# Patient Record
Sex: FEMALE | Race: WHITE | NOT HISPANIC OR LATINO | Employment: OTHER | URBAN - METROPOLITAN AREA
[De-identification: names, ages, dates, MRNs, and addresses within clinical notes are randomized per-mention and may not be internally consistent; named-entity substitution may affect disease eponyms.]

---

## 2020-08-21 RX ORDER — OXCARBAZEPINE 150 MG/1
TABLET, FILM COATED ORAL
Qty: 180 TABLET | Refills: 0 | Status: SHIPPED | OUTPATIENT
Start: 2020-08-21 | End: 2021-11-18 | Stop reason: SDUPTHER

## 2020-08-21 RX ORDER — LEVOTHYROXINE SODIUM 88 UG/1
TABLET ORAL
Qty: 45 TABLET | Refills: 0 | Status: SHIPPED | OUTPATIENT
Start: 2020-08-21 | End: 2020-10-13 | Stop reason: SDUPTHER

## 2020-08-21 RX ORDER — LISINOPRIL 2.5 MG/1
TABLET ORAL
Qty: 90 TABLET | Refills: 0 | Status: SHIPPED | OUTPATIENT
Start: 2020-08-21 | End: 2020-12-15

## 2020-08-21 RX ORDER — SAXAGLIPTIN 5 MG/1
TABLET, FILM COATED ORAL
Qty: 90 TABLET | Refills: 0 | Status: SHIPPED | OUTPATIENT
Start: 2020-08-21 | End: 2020-12-18

## 2020-09-21 DIAGNOSIS — G60.9 IDIOPATHIC PERIPHERAL NEUROPATHY: Primary | ICD-10-CM

## 2020-09-23 RX ORDER — GABAPENTIN 300 MG/1
CAPSULE ORAL
Qty: 180 CAPSULE | Refills: 0 | Status: SHIPPED | OUTPATIENT
Start: 2020-09-23 | End: 2020-09-25 | Stop reason: SDUPTHER

## 2020-09-23 RX ORDER — LEVOTHYROXINE SODIUM 0.1 MG/1
TABLET ORAL
Qty: 45 TABLET | Refills: 0 | Status: SHIPPED | OUTPATIENT
Start: 2020-09-23 | End: 2020-09-25 | Stop reason: SDUPTHER

## 2020-09-25 DIAGNOSIS — G60.9 IDIOPATHIC PERIPHERAL NEUROPATHY: ICD-10-CM

## 2020-09-25 DIAGNOSIS — E03.9 ACQUIRED HYPOTHYROIDISM: Primary | ICD-10-CM

## 2020-09-25 RX ORDER — LEVOTHYROXINE SODIUM 0.1 MG/1
100 TABLET ORAL EVERY OTHER DAY
Qty: 45 TABLET | Refills: 0 | Status: SHIPPED | OUTPATIENT
Start: 2020-09-25 | End: 2020-10-13

## 2020-09-25 RX ORDER — GABAPENTIN 300 MG/1
CAPSULE ORAL
Qty: 180 CAPSULE | Refills: 0 | Status: SHIPPED | OUTPATIENT
Start: 2020-09-25 | End: 2021-04-06 | Stop reason: SDUPTHER

## 2020-09-29 PROBLEM — E03.4 HYPOTHYROIDISM DUE TO ACQUIRED ATROPHY OF THYROID: Status: ACTIVE | Noted: 2020-09-29

## 2020-09-29 PROBLEM — E66.09 CLASS 1 OBESITY DUE TO EXCESS CALORIES WITH SERIOUS COMORBIDITY AND BODY MASS INDEX (BMI) OF 32.0 TO 32.9 IN ADULT: Status: ACTIVE | Noted: 2020-09-29

## 2020-09-29 PROBLEM — N18.30 CHRONIC KIDNEY DISEASE, STAGE III (MODERATE): Status: ACTIVE | Noted: 2020-09-29

## 2020-09-29 PROBLEM — E78.2 MIXED HYPERLIPIDEMIA: Status: ACTIVE | Noted: 2020-09-29

## 2020-09-29 PROBLEM — G60.9 IDIOPATHIC PERIPHERAL NEUROPATHY: Status: ACTIVE | Noted: 2020-09-29

## 2020-09-29 PROBLEM — E55.9 VITAMIN D DEFICIENCY: Status: ACTIVE | Noted: 2020-09-29

## 2020-09-29 PROBLEM — Z98.890 STATUS POST DISCECTOMY: Status: ACTIVE | Noted: 2020-09-29

## 2020-09-29 PROBLEM — Z87.19 HISTORY OF DIVERTICULOSIS: Status: ACTIVE | Noted: 2020-09-29

## 2020-09-29 PROBLEM — F32.A CHRONIC DEPRESSION: Status: ACTIVE | Noted: 2020-09-29

## 2020-09-29 PROBLEM — L98.8 FACIAL RHYTIDS: Status: ACTIVE | Noted: 2020-09-29

## 2020-09-29 PROBLEM — Z79.4 TYPE 2 DIABETES MELLITUS WITHOUT COMPLICATION, WITH LONG-TERM CURRENT USE OF INSULIN: Status: ACTIVE | Noted: 2020-09-29

## 2020-09-29 PROBLEM — Z86.010 HISTORY OF COLON POLYPS: Status: ACTIVE | Noted: 2020-09-29

## 2020-09-29 PROBLEM — Z90.49 STATUS POST CHOLECYSTECTOMY: Status: ACTIVE | Noted: 2020-09-29

## 2020-09-29 PROBLEM — E11.9 TYPE 2 DIABETES MELLITUS WITHOUT COMPLICATION, WITH LONG-TERM CURRENT USE OF INSULIN: Status: ACTIVE | Noted: 2020-09-29

## 2020-09-29 RX ORDER — FLURBIPROFEN SODIUM 0.3 MG/ML
SOLUTION/ DROPS OPHTHALMIC
COMMUNITY
Start: 2020-06-29 | End: 2021-05-20 | Stop reason: SDUPTHER

## 2020-09-29 RX ORDER — DULOXETIN HYDROCHLORIDE 60 MG/1
CAPSULE, DELAYED RELEASE ORAL
COMMUNITY
Start: 2020-09-21 | End: 2021-04-06 | Stop reason: SDUPTHER

## 2020-09-29 RX ORDER — INSULIN GLARGINE 100 [IU]/ML
INJECTION, SOLUTION SUBCUTANEOUS
COMMUNITY
Start: 2020-08-28 | End: 2021-04-06

## 2020-09-29 RX ORDER — METFORMIN HYDROCHLORIDE 1000 MG/1
1000 TABLET, FILM COATED, EXTENDED RELEASE ORAL 2 TIMES DAILY
COMMUNITY
Start: 2020-09-18 | End: 2020-09-29 | Stop reason: SDUPTHER

## 2020-09-29 RX ORDER — GLIPIZIDE 10 MG/1
TABLET, FILM COATED, EXTENDED RELEASE ORAL
COMMUNITY
Start: 2020-07-16 | End: 2020-11-10

## 2020-09-29 RX ORDER — DULOXETIN HYDROCHLORIDE 30 MG/1
CAPSULE, DELAYED RELEASE ORAL
COMMUNITY
Start: 2020-09-21 | End: 2020-10-28

## 2020-09-29 RX ORDER — METFORMIN HYDROCHLORIDE 1000 MG/1
1000 TABLET, FILM COATED, EXTENDED RELEASE ORAL 2 TIMES DAILY
Qty: 60 TABLET | Refills: 0 | Status: SHIPPED | OUTPATIENT
Start: 2020-09-29 | End: 2020-10-14 | Stop reason: SDUPTHER

## 2020-10-07 ENCOUNTER — RESULTS ENCOUNTER (OUTPATIENT)
Dept: INTERNAL MEDICINE | Facility: CLINIC | Age: 80
End: 2020-10-07

## 2020-10-07 DIAGNOSIS — E03.4 HYPOTHYROIDISM DUE TO ACQUIRED ATROPHY OF THYROID: ICD-10-CM

## 2020-10-07 DIAGNOSIS — E03.9 ACQUIRED HYPOTHYROIDISM: ICD-10-CM

## 2020-10-07 DIAGNOSIS — E55.9 VITAMIN D DEFICIENCY: ICD-10-CM

## 2020-10-07 DIAGNOSIS — Z79.4 TYPE 2 DIABETES MELLITUS WITHOUT COMPLICATION, WITH LONG-TERM CURRENT USE OF INSULIN (HCC): ICD-10-CM

## 2020-10-07 DIAGNOSIS — E78.2 MIXED HYPERLIPIDEMIA: ICD-10-CM

## 2020-10-07 DIAGNOSIS — E11.9 TYPE 2 DIABETES MELLITUS WITHOUT COMPLICATION, WITH LONG-TERM CURRENT USE OF INSULIN (HCC): ICD-10-CM

## 2020-10-08 DIAGNOSIS — E03.4 HYPOTHYROIDISM DUE TO ACQUIRED ATROPHY OF THYROID: ICD-10-CM

## 2020-10-08 DIAGNOSIS — E11.9 TYPE 2 DIABETES MELLITUS WITHOUT COMPLICATION, WITH LONG-TERM CURRENT USE OF INSULIN (HCC): ICD-10-CM

## 2020-10-08 DIAGNOSIS — Z79.4 TYPE 2 DIABETES MELLITUS WITHOUT COMPLICATION, WITH LONG-TERM CURRENT USE OF INSULIN (HCC): ICD-10-CM

## 2020-10-08 DIAGNOSIS — E78.2 MIXED HYPERLIPIDEMIA: ICD-10-CM

## 2020-10-08 DIAGNOSIS — E03.9 ACQUIRED HYPOTHYROIDISM: ICD-10-CM

## 2020-10-08 DIAGNOSIS — E55.9 VITAMIN D DEFICIENCY: ICD-10-CM

## 2020-10-10 LAB
25(OH)D3+25(OH)D2 SERPL-MCNC: 41.5 NG/ML (ref 30–100)
ALBUMIN SERPL-MCNC: 4.4 G/DL (ref 3.7–4.7)
ALBUMIN/CREAT UR: 26 MG/G CREAT (ref 0–29)
ALBUMIN/GLOB SERPL: 1.9 {RATIO} (ref 1.2–2.2)
ALP SERPL-CCNC: 84 IU/L (ref 39–117)
ALT SERPL-CCNC: 14 IU/L (ref 0–32)
AST SERPL-CCNC: 15 IU/L (ref 0–40)
BASOPHILS # BLD AUTO: 0 X10E3/UL (ref 0–0.2)
BASOPHILS NFR BLD AUTO: 1 %
BILIRUB SERPL-MCNC: 0.4 MG/DL (ref 0–1.2)
BUN SERPL-MCNC: 13 MG/DL (ref 8–27)
BUN/CREAT SERPL: 12 (ref 12–28)
CALCIUM SERPL-MCNC: 9.3 MG/DL (ref 8.7–10.3)
CHLORIDE SERPL-SCNC: 102 MMOL/L (ref 96–106)
CHOLEST SERPL-MCNC: 117 MG/DL (ref 100–199)
CO2 SERPL-SCNC: 23 MMOL/L (ref 20–29)
CREAT SERPL-MCNC: 1.05 MG/DL (ref 0.57–1)
CREAT UR-MCNC: 114.6 MG/DL
EOSINOPHIL # BLD AUTO: 0.3 X10E3/UL (ref 0–0.4)
EOSINOPHIL NFR BLD AUTO: 4 %
ERYTHROCYTE [DISTWIDTH] IN BLOOD BY AUTOMATED COUNT: 13 % (ref 11.7–15.4)
GLOBULIN SER CALC-MCNC: 2.3 G/DL (ref 1.5–4.5)
GLUCOSE SERPL-MCNC: 60 MG/DL (ref 65–99)
HCT VFR BLD AUTO: 35.9 % (ref 34–46.6)
HDLC SERPL-MCNC: 46 MG/DL
HGB BLD-MCNC: 12.3 G/DL (ref 11.1–15.9)
IMM GRANULOCYTES # BLD AUTO: 0 X10E3/UL (ref 0–0.1)
IMM GRANULOCYTES NFR BLD AUTO: 0 %
LDLC SERPL CALC-MCNC: 48 MG/DL (ref 0–99)
LDLC/HDLC SERPL: 1 RATIO (ref 0–3.2)
LYMPHOCYTES # BLD AUTO: 1.4 X10E3/UL (ref 0.7–3.1)
LYMPHOCYTES NFR BLD AUTO: 19 %
MCH RBC QN AUTO: 31.3 PG (ref 26.6–33)
MCHC RBC AUTO-ENTMCNC: 34.3 G/DL (ref 31.5–35.7)
MCV RBC AUTO: 91 FL (ref 79–97)
MICROALBUMIN UR-MCNC: 30 UG/ML
MONOCYTES # BLD AUTO: 0.4 X10E3/UL (ref 0.1–0.9)
MONOCYTES NFR BLD AUTO: 6 %
NEUTROPHILS # BLD AUTO: 5.1 X10E3/UL (ref 1.4–7)
NEUTROPHILS NFR BLD AUTO: 70 %
PLATELET # BLD AUTO: 345 X10E3/UL (ref 150–450)
POTASSIUM SERPL-SCNC: 4.4 MMOL/L (ref 3.5–5.2)
PROT SERPL-MCNC: 6.7 G/DL (ref 6–8.5)
RBC # BLD AUTO: 3.93 X10E6/UL (ref 3.77–5.28)
SODIUM SERPL-SCNC: 141 MMOL/L (ref 134–144)
T4 FREE SERPL-MCNC: 1.22 NG/DL (ref 0.82–1.77)
TRIGL SERPL-MCNC: 130 MG/DL (ref 0–149)
TSH SERPL DL<=0.005 MIU/L-ACNC: 0.11 UIU/ML (ref 0.45–4.5)
VIT B12 SERPL-MCNC: 1129 PG/ML (ref 232–1245)
VLDLC SERPL CALC-MCNC: 23 MG/DL (ref 5–40)
WBC # BLD AUTO: 7.2 X10E3/UL (ref 3.4–10.8)

## 2020-10-13 ENCOUNTER — OFFICE VISIT (OUTPATIENT)
Dept: INTERNAL MEDICINE | Facility: CLINIC | Age: 80
End: 2020-10-13

## 2020-10-13 ENCOUNTER — TELEPHONE (OUTPATIENT)
Dept: INTERNAL MEDICINE | Facility: CLINIC | Age: 80
End: 2020-10-13

## 2020-10-13 VITALS
WEIGHT: 163 LBS | RESPIRATION RATE: 16 BRPM | SYSTOLIC BLOOD PRESSURE: 108 MMHG | TEMPERATURE: 96.8 F | HEART RATE: 83 BPM | DIASTOLIC BLOOD PRESSURE: 72 MMHG | HEIGHT: 60 IN | OXYGEN SATURATION: 96 % | BODY MASS INDEX: 32 KG/M2

## 2020-10-13 DIAGNOSIS — E66.09 CLASS 1 OBESITY DUE TO EXCESS CALORIES WITH SERIOUS COMORBIDITY AND BODY MASS INDEX (BMI) OF 32.0 TO 32.9 IN ADULT: ICD-10-CM

## 2020-10-13 DIAGNOSIS — Z86.010 HISTORY OF COLON POLYPS: ICD-10-CM

## 2020-10-13 DIAGNOSIS — N18.30 CHRONIC KIDNEY DISEASE, STAGE III (MODERATE) (HCC): ICD-10-CM

## 2020-10-13 DIAGNOSIS — G60.9 IDIOPATHIC PERIPHERAL NEUROPATHY: ICD-10-CM

## 2020-10-13 DIAGNOSIS — E55.9 VITAMIN D DEFICIENCY: Primary | ICD-10-CM

## 2020-10-13 DIAGNOSIS — E03.4 HYPOTHYROIDISM DUE TO ACQUIRED ATROPHY OF THYROID: ICD-10-CM

## 2020-10-13 DIAGNOSIS — E78.2 MIXED HYPERLIPIDEMIA: ICD-10-CM

## 2020-10-13 DIAGNOSIS — Z79.4 TYPE 2 DIABETES MELLITUS WITHOUT COMPLICATION, WITH LONG-TERM CURRENT USE OF INSULIN (HCC): ICD-10-CM

## 2020-10-13 DIAGNOSIS — E11.9 TYPE 2 DIABETES MELLITUS WITHOUT COMPLICATION, WITH LONG-TERM CURRENT USE OF INSULIN (HCC): ICD-10-CM

## 2020-10-13 DIAGNOSIS — F32.A CHRONIC DEPRESSION: ICD-10-CM

## 2020-10-13 PROCEDURE — 99214 OFFICE O/P EST MOD 30 MIN: CPT | Performed by: INTERNAL MEDICINE

## 2020-10-13 RX ORDER — LEVOTHYROXINE SODIUM 88 UG/1
88 TABLET ORAL DAILY
Qty: 30 TABLET | Refills: 1 | Status: SHIPPED | OUTPATIENT
Start: 2020-10-13 | End: 2021-04-06

## 2020-10-13 NOTE — PATIENT INSTRUCTIONS
-Diet, low carbohydrate, high-protein (diets that are low in carbohydrates and high in protein are very popular for weight loss)  -Exercise 30 to 45 minutes, 4-5 times per week  -Giving encouragement to exercise (we encourage all of her patients to exercise regularly 30 minutes of exercise 5 or more days per week)  -Special dietary instruction-we recommend you modify your diet to achieve and maintain a healthy weight.  Being overweight may increase your risk for developing health problems such as diabetes, heart disease and cancer.  Avoid high fat foods and eat a balanced diet rich in fruits and vegetables.  The combination of a reduced calorie diet and increased physical activity is recommended.    Diabetes handout:    Recommended fasting blood glucose     Recommend glucose 2 hours after meals less than 180    Symptoms of low blood glucose:  Confusion Dizziness feeling shaky Hunger Headache   Irritability Sweating Trembling Weakness Anxiety  Pale skin pounding heart    If your blood glucose is low, follow the steps below:  Follow the 15-15 rule: Eat or drink something from the list below equal to 15 g of carbohydrates  Rest for 15 minutes, then recheck your blood glucose.  If it is still low, (below 70), repeat step 1 above.  If your next meal is more than 1 hour away, you will need to eat 1 carbohydrate choice as a snack to keep your blood glucose from going low again.  If you cannot figure out why you have low blood glucose, call the office or go to the emergency room, as your medication may need to be adjusted.  Always carry something with you to treat an insulin reaction.  Use food from the list below.    Foods equal to 1 carbohydrate choice (15 g of carbohydrates):  3 glucose tablets or 4 dextrose tablets  4 ounces of fruit juice  5 to 6 ounces (approximately one half can) of regular soda such as a Coke or Pepsi  7-8 gummy or regular lifesavers  1 tablespoon of sugar or jelly.    If you have type 1  diabetes and you do not take care of low blood glucose, you may pass out.  If you do, a drug called glucagon should be injected into your skin, like you do with insulin.  This can be done by a family member or a friend who has been taught how to do it.  Since glucagon may cause you to vomit, you should be placed on your side when the injection is given.  If no one knows how to give the injection, you should be taken to the hospital.    You should awaken 10 minutes after the glucagon is injected.  If you do not, you should be taken at once to the hospital.    Cardiovascular risk in diabetic patients.    The presence of diabetes is associated with increased cardiovascular risk, particularly among women.  Patients with diabetes have a 2-4 times increased risk for cardiovascular disease, with more than two thirds of patients with diabetes eventually dying of heart disease.  The risk for stroke is 1.8-1.6 fold in diabetics.  Patients with diabetes are more likely to have undiagnosed coronary artery disease and have worse outcomes when hospitalized for other cardiovascular conditions, such as heart failure.    Hemoglobin A1c    Your blood sugar levels vary from minute to minute an hour to hour.  That is why you must test your blood sugar at home at certain times of the day.  You may test before and after meals, before and after exercise and whenever there are interruptions to your usual daily routine (travel, illness, stress, etc.).  However, you cannot test your blood sugar all of the time!  So, how can you know what your overall level of control is?    The hemoglobin A1c (HbA1C) test is like a batting average.  For example, doris Pinto was able to hit home runs sometimes and strike out at others.  However, his overall batting average showed that he was a great hitter most of the time!  Your hemoglobin A1c let you know how well you have controlled your blood sugar on the average for the last 3 months.  The chart below  compares the results of a hemoglobin A1c to an average daily blood sugar range.    Hemoglobin A1c      average blood sugar range for the last 3 months.  4.0 to 6.0%        mg/deciliter-normal  6.1 to 7.0%       120-150 mg/deciliter  7.1 to 8.0%       151-180 mg/deciliter  8.1 to 9.0%       181-210 mg/deciliter  9.1 to 10%       211-240 mg/deciliter  10.1 to 11%       241-270 mg/deciliter  11.1 to 12%       271-300 mg/deciliter  12.1% to 13%       301-330 mg/deciliter  13.1% to 14%       331-360 mg/deciliter  Greater than 14%      greater than 360 mg/deciliter    How does hemoglobin A1c work?  The hemoglobin A1c measures the amount of sugar that attaches to protein in your red blood cells.  Your red blood cells live for ~2 to 3 months, so this test shows your average blood sugar levels during this time.  The greater the amount of sugar in your blood and the longer it is high, the more sugar that will attached to those red blood cells.

## 2020-10-13 NOTE — TELEPHONE ENCOUNTER
Patient called and states that she takes the levothyroxine 88 MG on Monday, Wednesday, Friday, Sunday and the 100 MG on Tuesday, Thursday, Sunday.    Patient states she also received her flu shot.    Thank You

## 2020-10-14 RX ORDER — METFORMIN HYDROCHLORIDE 1000 MG/1
1000 TABLET, FILM COATED, EXTENDED RELEASE ORAL 2 TIMES DAILY
Qty: 180 TABLET | Refills: 0 | Status: SHIPPED | OUTPATIENT
Start: 2020-10-14 | End: 2020-10-16 | Stop reason: SDUPTHER

## 2020-10-16 DIAGNOSIS — Z79.4 TYPE 2 DIABETES MELLITUS WITHOUT COMPLICATION, WITH LONG-TERM CURRENT USE OF INSULIN (HCC): Primary | ICD-10-CM

## 2020-10-16 DIAGNOSIS — E11.9 TYPE 2 DIABETES MELLITUS WITHOUT COMPLICATION, WITH LONG-TERM CURRENT USE OF INSULIN (HCC): Primary | ICD-10-CM

## 2020-10-16 RX ORDER — METFORMIN HYDROCHLORIDE 1000 MG/1
1000 TABLET, FILM COATED, EXTENDED RELEASE ORAL 2 TIMES DAILY
Qty: 180 TABLET | Refills: 1 | Status: SHIPPED | OUTPATIENT
Start: 2020-10-16 | End: 2021-06-17 | Stop reason: SDUPTHER

## 2020-10-18 ENCOUNTER — RESULTS ENCOUNTER (OUTPATIENT)
Dept: INTERNAL MEDICINE | Facility: CLINIC | Age: 80
End: 2020-10-18

## 2020-10-18 DIAGNOSIS — E03.4 HYPOTHYROIDISM DUE TO ACQUIRED ATROPHY OF THYROID: ICD-10-CM

## 2020-10-20 RX ORDER — ATORVASTATIN CALCIUM 40 MG/1
TABLET, FILM COATED ORAL
Qty: 90 TABLET | Refills: 0 | Status: SHIPPED | OUTPATIENT
Start: 2020-10-20 | End: 2021-02-01

## 2020-10-20 NOTE — TELEPHONE ENCOUNTER
Next visit: 1/14/21    Last refill: No refill in epic-office note from 10/13 states continue current tx of Atorvatstain 40 mg.

## 2020-10-28 RX ORDER — DULOXETIN HYDROCHLORIDE 30 MG/1
CAPSULE, DELAYED RELEASE ORAL
Qty: 90 CAPSULE | Refills: 1 | Status: SHIPPED | OUTPATIENT
Start: 2020-10-28 | End: 2021-04-06 | Stop reason: SDUPTHER

## 2020-11-10 RX ORDER — GLIPIZIDE 10 MG/1
TABLET, FILM COATED, EXTENDED RELEASE ORAL
Qty: 180 TABLET | Refills: 0 | Status: SHIPPED | OUTPATIENT
Start: 2020-11-10 | End: 2021-04-23 | Stop reason: SDUPTHER

## 2020-12-15 RX ORDER — LISINOPRIL 2.5 MG/1
TABLET ORAL
Qty: 90 TABLET | Refills: 0 | Status: SHIPPED | OUTPATIENT
Start: 2020-12-15 | End: 2021-06-02 | Stop reason: SDUPTHER

## 2020-12-18 RX ORDER — SAXAGLIPTIN 5 MG/1
TABLET, FILM COATED ORAL
Qty: 30 TABLET | Refills: 0 | Status: SHIPPED | OUTPATIENT
Start: 2020-12-18 | End: 2021-04-06 | Stop reason: SDUPTHER

## 2021-02-01 RX ORDER — ATORVASTATIN CALCIUM 40 MG/1
TABLET, FILM COATED ORAL
Qty: 30 TABLET | Refills: 1 | Status: SHIPPED | OUTPATIENT
Start: 2021-02-01 | End: 2021-07-12

## 2021-03-02 DIAGNOSIS — Z23 IMMUNIZATION DUE: ICD-10-CM

## 2021-04-06 ENCOUNTER — OFFICE VISIT (OUTPATIENT)
Dept: INTERNAL MEDICINE | Facility: CLINIC | Age: 81
End: 2021-04-06

## 2021-04-06 VITALS
HEIGHT: 60 IN | BODY MASS INDEX: 31.8 KG/M2 | SYSTOLIC BLOOD PRESSURE: 118 MMHG | DIASTOLIC BLOOD PRESSURE: 80 MMHG | TEMPERATURE: 97.5 F | WEIGHT: 162 LBS

## 2021-04-06 DIAGNOSIS — Z86.010 HISTORY OF COLON POLYPS: ICD-10-CM

## 2021-04-06 DIAGNOSIS — E03.4 HYPOTHYROIDISM DUE TO ACQUIRED ATROPHY OF THYROID: Chronic | ICD-10-CM

## 2021-04-06 DIAGNOSIS — Z79.4 TYPE 2 DIABETES MELLITUS WITHOUT COMPLICATION, WITH LONG-TERM CURRENT USE OF INSULIN (HCC): Primary | Chronic | ICD-10-CM

## 2021-04-06 DIAGNOSIS — F32.A CHRONIC DEPRESSION: Chronic | ICD-10-CM

## 2021-04-06 DIAGNOSIS — N18.31 STAGE 3A CHRONIC KIDNEY DISEASE (HCC): ICD-10-CM

## 2021-04-06 DIAGNOSIS — Z11.59 NEED FOR HEPATITIS C SCREENING TEST: ICD-10-CM

## 2021-04-06 DIAGNOSIS — Z78.0 POST-MENOPAUSAL: ICD-10-CM

## 2021-04-06 DIAGNOSIS — G60.9 IDIOPATHIC PERIPHERAL NEUROPATHY: Chronic | ICD-10-CM

## 2021-04-06 DIAGNOSIS — E11.9 TYPE 2 DIABETES MELLITUS WITHOUT COMPLICATION, WITH LONG-TERM CURRENT USE OF INSULIN (HCC): Primary | Chronic | ICD-10-CM

## 2021-04-06 PROBLEM — N18.30 CHRONIC KIDNEY DISEASE, STAGE III (MODERATE): Chronic | Status: ACTIVE | Noted: 2020-09-29

## 2021-04-06 PROBLEM — E78.2 MIXED HYPERLIPIDEMIA: Chronic | Status: ACTIVE | Noted: 2020-09-29

## 2021-04-06 PROCEDURE — 99214 OFFICE O/P EST MOD 30 MIN: CPT | Performed by: INTERNAL MEDICINE

## 2021-04-06 RX ORDER — DULOXETIN HYDROCHLORIDE 30 MG/1
30 CAPSULE, DELAYED RELEASE ORAL DAILY
Qty: 90 CAPSULE | Refills: 3 | Status: SHIPPED | OUTPATIENT
Start: 2021-04-06 | End: 2021-08-20 | Stop reason: SDUPTHER

## 2021-04-06 RX ORDER — LEVOTHYROXINE SODIUM 0.1 MG/1
100 TABLET ORAL EVERY OTHER DAY
COMMUNITY
End: 2021-04-06

## 2021-04-06 RX ORDER — LEVOTHYROXINE SODIUM 88 UG/1
88 TABLET ORAL DAILY
COMMUNITY
End: 2021-06-14 | Stop reason: SDUPTHER

## 2021-04-06 RX ORDER — ASPIRIN 81 MG/1
81 TABLET ORAL DAILY
COMMUNITY
End: 2022-11-29

## 2021-04-06 RX ORDER — LANOLIN ALCOHOL/MO/W.PET/CERES
1000 CREAM (GRAM) TOPICAL DAILY
COMMUNITY

## 2021-04-06 RX ORDER — LEVOTHYROXINE SODIUM 112 UG/1
112 TABLET ORAL EVERY OTHER DAY
COMMUNITY
End: 2021-11-22

## 2021-04-06 RX ORDER — GABAPENTIN 300 MG/1
CAPSULE ORAL
Qty: 180 CAPSULE | Refills: 1 | Status: SHIPPED | OUTPATIENT
Start: 2021-04-06 | End: 2021-08-01 | Stop reason: SDUPTHER

## 2021-04-06 RX ORDER — DULOXETIN HYDROCHLORIDE 60 MG/1
60 CAPSULE, DELAYED RELEASE ORAL DAILY
Qty: 90 CAPSULE | Refills: 3 | Status: SHIPPED | OUTPATIENT
Start: 2021-04-06 | End: 2021-08-20 | Stop reason: SDUPTHER

## 2021-04-06 NOTE — PROGRESS NOTES
Subjective        Chief Complaint   Patient presents with   • Establish Care           Yamsin Cedeno is a 81 y.o. female who presents for    Patient Active Problem List   Diagnosis   • Type 2 diabetes mellitus without complication, with long-term current use of insulin (CMS/[facility])   • Chronic kidney disease, stage III (moderate)   • Vitamin D deficiency   • Mixed hyperlipidemia   • Hypothyroidism due to acquired atrophy of thyroid   • Chronic depression   • Idiopathic peripheral neuropathy   • History of colon polyps   • Facial rhytids   • Class 1 obesity due to excess calories with serious comorbidity and body mass index (BMI) of 32.0 to 32.9 in adult       History of Present Illness     She had been seeing Dr. Schwartz for over a decade. Her sugars run . She does not check her BP. She denies chest pain, dyspnea, nausea or abdominal pain. She has been on Cymbalta since her   8 years ago. She has had two back surgeries in the past. She thinks she takes Trileptal for jumping in her feet. Dr. Schwartz's last note says she was taking it and Neurontin for peripheral neuropathy.  Allergies   Allergen Reactions   • Cefaclor Hives   • Epinephrine Palpitations     Elevated heart rate at the dentist       Current Outpatient Medications on File Prior to Visit   Medication Sig Dispense Refill   • aspirin (aspirin) 81 MG EC tablet Take 81 mg by mouth Daily.     • atorvastatin (LIPITOR) 40 MG tablet TAKE ONE TABLET BY MOUTH DAILY 30 tablet 1   • B-D UF III MINI PEN NEEDLES 31G X 5 MM misc      • glipizide (GLUCOTROL XL) 10 MG 24 hr tablet TAKE TWO TABLETS BY MOUTH DAILY 180 tablet 0   • Insulin Glargine (LANTUS SOLOSTAR) 100 UNIT/ML injection pen Inject 23 Units under the skin into the appropriate area as directed Daily.     • levothyroxine (SYNTHROID, LEVOTHROID) 112 MCG tablet Take 112 mcg by mouth Every Other Day.     • levothyroxine (SYNTHROID, LEVOTHROID) 88 MCG tablet Take 88 mcg by mouth Daily. Take one tab  qod     • lisinopril (PRINIVIL,ZESTRIL) 2.5 MG tablet TAKE ONE TABLET BY MOUTH DAILY 90 tablet 0   • metFORMIN (GLUMETZA) 1000 MG (MOD) 24 hr tablet Take 1 tablet by mouth 2 (Two) Times a Day. 180 tablet 1   • OXcarbazepine (TRILEPTAL) 150 MG tablet TAKE ONE TABLET BY MOUTH TWICE A  tablet 0   • vitamin B-12 (CYANOCOBALAMIN) 1000 MCG tablet Take 1,000 mcg by mouth Daily.     • [DISCONTINUED] DULoxetine (CYMBALTA) 30 MG capsule TAKE ONE CAPSULE BY MOUTH DAILY 90 capsule 1   • [DISCONTINUED] DULoxetine (CYMBALTA) 60 MG capsule      • [DISCONTINUED] gabapentin (NEURONTIN) 300 MG capsule 2 TABS PO QHS AS NEEDED 180 capsule 0   • [DISCONTINUED] Lantus SoloStar 100 UNIT/ML injection pen      • [DISCONTINUED] levothyroxine (SYNTHROID, LEVOTHROID) 100 MCG tablet Take 100 mcg by mouth Every Other Day.     • [DISCONTINUED] Onglyza 5 MG tablet TAKE ONE TABLET BY MOUTH DAILY 30 tablet 0   • [DISCONTINUED] levothyroxine (Synthroid) 88 MCG tablet Take 1 tablet by mouth Daily. 30 tablet 1     No current facility-administered medications on file prior to visit.       Past Medical History:   Diagnosis Date   • AC joint arthropathy    • B12 deficiency    • Chronic back pain    • History of colon polyps    • History of eczema    • History of fall    • History of urinary tract infection    • Insomnia    • Loose body of shoulder    • Low back pain with radiation    • Nonspecific reaction to tuberculin skin test without active tuberculosis     0 active evie delgado 12/2002 Ct Chest 09/2002   • Obesity, Class I, BMI 30-34.9    • Osteoarthritis of shoulder    • Peripheral neuropathy     Back surgery   • Psoriasis    • Rhytides    • Rotator cuff tear    • Spondylolysis    • Tinnitus    • Vitamin D deficiency    • Yeast infection        Past Surgical History:   Procedure Laterality Date   • BACK SURGERY      discectomy L5-S1. Hodes 01/06 & 03/06 repeated. Repeat Disc surgery 03/2003.   • CATARACT EXTRACTION      Left, [place]  "S/p Cataract Rt    • CHOLECYSTECTOMY  1999    [place]   • COLONOSCOPY     • TONSILLECTOMY  89       Family History   Problem Relation Age of Onset   • Hypertension Mother    • Other Mother         acute myocardial infarction    • Other Father         acute myocardial infarction    • Diabetes Maternal Grandfather    • Breast cancer Other    • Liver cancer Other        Social History     Socioeconomic History   • Marital status:      Spouse name: Not on file   • Number of children: Not on file   • Years of education: Not on file   • Highest education level: Not on file   Tobacco Use   • Smoking status: Former Smoker     Quit date:      Years since quittin.2   • Smokeless tobacco: Never Used   Substance and Sexual Activity   • Alcohol use: Never   • Drug use: Never           The following portions of the patient's history were reviewed and updated as appropriate: problem list, allergies, current medications, past medical history, past family history, past social history and past surgical history.    Review of Systems    Immunization History   Administered Date(s) Administered   • COVID-19 (PFIZER) 2021, 2021   • Hepatitis A 2018, 2019   • Pneumococcal Conjugate 13-Valent (PCV13) 2018   • Pneumococcal Polysaccharide (PPSV23) 2019       Objective   Vitals:    21 0950   BP: 118/80   Temp: 97.5 °F (36.4 °C)   Weight: 73.5 kg (162 lb)   Height: 152.4 cm (60\")     Body mass index is 31.64 kg/m².  Physical Exam  Vitals reviewed.   Constitutional:       Appearance: She is well-developed.   HENT:      Head: Normocephalic and atraumatic.   Neck:      Thyroid: No thyromegaly.      Vascular: No carotid bruit.      Trachea: Trachea normal.   Cardiovascular:      Rate and Rhythm: Normal rate and regular rhythm.      Heart sounds: Normal heart sounds, S1 normal and S2 normal.   Pulmonary:      Effort: Pulmonary effort is normal.      Breath sounds: Normal breath " sounds.   Lymphadenopathy:      Cervical: No cervical adenopathy.   Skin:     General: Skin is warm.   Neurological:      Mental Status: She is alert.   Psychiatric:         Behavior: Behavior normal.         Procedures    Assessment/Plan   Diagnoses and all orders for this visit:    1. Type 2 diabetes mellitus without complication, with long-term current use of insulin (CMS/HCC) (Primary)  -     Comprehensive Metabolic Panel  -     Hemoglobin A1c  -     SAXagliptin (Onglyza) 5 MG tablet; Take 1 tablet by mouth Daily.  Dispense: 90 tablet; Refill: 3  -     Hemoglobin A1c; Future  -     Lipid Panel With / Chol / HDL Ratio; Future  -     Microalbumin / Creatinine Urine Ratio - Urine, Clean Catch; Future    2. Hypothyroidism due to acquired atrophy of thyroid  -     TSH  -     TSH; Future    3. History of colon polyps  -     Ambulatory Referral For Screening Colonoscopy    4. Stage 3a chronic kidney disease (CMS/HCC)  -     Comprehensive Metabolic Panel; Future    5. Chronic depression  -     DULoxetine (CYMBALTA) 60 MG capsule; Take 1 capsule by mouth Daily.  Dispense: 90 capsule; Refill: 3  -     DULoxetine (CYMBALTA) 30 MG capsule; Take 1 capsule by mouth Daily.  Dispense: 90 capsule; Refill: 3    6. Idiopathic peripheral neuropathy  -     gabapentin (NEURONTIN) 300 MG capsule; 2 TABS PO QHS AS NEEDED  Dispense: 180 capsule; Refill: 1    7. Post-menopausal  -     DEXA Bone Density Axial    8. Need for hepatitis C screening test  -     Hepatitis C Antibody               Recc Tdap and Shingrix. Baldomero TINSLEY. Set up cscope since sounds like been 5 years. Labs today. BP is good. I reviewed Dr. Schwartz's last note from here and her last one from [facility]. She had not been taking her onglyza for a while waiting to be seen here.  Return in about 4 months (around 8/6/2021) for Lab Today, Lab Before [facility], Medicare Wellness.

## 2021-04-07 LAB
ALBUMIN SERPL-MCNC: 4.6 G/DL (ref 3.5–5.2)
ALBUMIN/GLOB SERPL: 2.1 G/DL
ALP SERPL-CCNC: 87 U/L (ref 39–117)
ALT SERPL-CCNC: 13 U/L (ref 1–33)
AST SERPL-CCNC: 14 U/L (ref 1–32)
BILIRUB SERPL-MCNC: 0.5 MG/DL (ref 0–1.2)
BUN SERPL-MCNC: 12 MG/DL (ref 8–23)
BUN/CREAT SERPL: 10 (ref 7–25)
CALCIUM SERPL-MCNC: 10.2 MG/DL (ref 8.6–10.5)
CHLORIDE SERPL-SCNC: 102 MMOL/L (ref 98–107)
CO2 SERPL-SCNC: 28.6 MMOL/L (ref 22–29)
CREAT SERPL-MCNC: 1.2 MG/DL (ref 0.57–1)
GLOBULIN SER CALC-MCNC: 2.2 GM/DL
GLUCOSE SERPL-MCNC: 91 MG/DL (ref 65–99)
HBA1C MFR BLD: 7.4 % (ref 4.8–5.6)
HCV AB S/CO SERPL IA: <0.1 S/CO RATIO (ref 0–0.9)
POTASSIUM SERPL-SCNC: 4.9 MMOL/L (ref 3.5–5.2)
PROT SERPL-MCNC: 6.8 G/DL (ref 6–8.5)
SODIUM SERPL-SCNC: 143 MMOL/L (ref 136–145)
TSH SERPL DL<=0.005 MIU/L-ACNC: 0.76 UIU/ML (ref 0.27–4.2)

## 2021-04-09 NOTE — PROGRESS NOTES
PT had not been taking lantus for a week and a half due to no one would fill it until she had an appt. Should she increase or stay the same since she hasn't had any

## 2021-04-23 RX ORDER — GLIPIZIDE 10 MG/1
20 TABLET, FILM COATED, EXTENDED RELEASE ORAL DAILY
Qty: 180 TABLET | Refills: 0 | Status: SHIPPED | OUTPATIENT
Start: 2021-04-23 | End: 2021-06-08

## 2021-04-23 NOTE — TELEPHONE ENCOUNTER
Last ov 4/6/21  Ok to refill ?    Glipizide 10 mg    Take 2 tablets osmar     Qty 180    Jose TRIANA

## 2021-05-20 RX ORDER — FLURBIPROFEN SODIUM 0.3 MG/ML
SOLUTION/ DROPS OPHTHALMIC
Qty: 50 EACH | Refills: 3 | Status: SHIPPED | OUTPATIENT
Start: 2021-05-20 | End: 2022-08-23

## 2021-05-20 NOTE — TELEPHONE ENCOUNTER
Caller: Yasmin Cedeno    Relationship: Self    Best call back number: [phone]     Medication needed:   Requested Prescriptions     Pending Prescriptions Disp Refills   • B-D UF III MINI PEN NEEDLES 31G X 5 MM misc         When do you need the refill by: ASAP    Does the patient have less than a 3 day supply:  [x] Yes  [] No    What is the patient's preferred pharmacy: [facility] AT [facility] [facility] [phone] [facility] [phone] FX

## 2021-06-02 RX ORDER — LISINOPRIL 2.5 MG/1
2.5 TABLET ORAL DAILY
Qty: 90 TABLET | Refills: 0 | Status: SHIPPED | OUTPATIENT
Start: 2021-06-02 | End: 2021-10-14

## 2021-06-02 RX ORDER — INSULIN GLARGINE 100 [IU]/ML
INJECTION, SOLUTION SUBCUTANEOUS
Qty: 15 ML | Refills: 0 | Status: SHIPPED | OUTPATIENT
Start: 2021-06-02 | End: 2021-08-01 | Stop reason: SDUPTHER

## 2021-06-04 ENCOUNTER — HOSPITAL ENCOUNTER (EMERGENCY)
Facility: HOSPITAL | Age: 81
Discharge: HOME | End: 2021-06-04
Attending: EMERGENCY MEDICINE | Admitting: EMERGENCY MEDICINE

## 2021-06-04 ENCOUNTER — APPOINTMENT (OUTPATIENT)
Dept: GENERAL RADIOLOGY | Facility: HOSPITAL | Age: 81
End: 2021-06-04

## 2021-06-04 VITALS
SYSTOLIC BLOOD PRESSURE: 125 MMHG | WEIGHT: 163.58 LBS | DIASTOLIC BLOOD PRESSURE: 82 MMHG | HEART RATE: 96 BPM | OXYGEN SATURATION: 95 % | TEMPERATURE: 97.4 F | BODY MASS INDEX: 32.12 KG/M2 | HEIGHT: 60 IN | RESPIRATION RATE: 16 BRPM

## 2021-06-04 DIAGNOSIS — E11.649 HYPOGLYCEMIC EVENT IN DIABETES (HCC): Primary | ICD-10-CM

## 2021-06-04 DIAGNOSIS — E83.42 HYPOMAGNESEMIA: ICD-10-CM

## 2021-06-04 DIAGNOSIS — N39.0 URINARY TRACT INFECTION IN FEMALE: ICD-10-CM

## 2021-06-04 LAB
ALBUMIN SERPL-MCNC: 4 G/DL (ref 3.5–5.2)
ALBUMIN/GLOB SERPL: 1.4 G/DL
ALP SERPL-CCNC: 90 U/L (ref 39–117)
ALT SERPL W P-5'-P-CCNC: 11 U/L (ref 1–33)
ANION GAP SERPL CALCULATED.3IONS-SCNC: 12 MMOL/L (ref 5–15)
AST SERPL-CCNC: 15 U/L (ref 1–32)
BACTERIA UR QL AUTO: ABNORMAL /HPF
BASOPHILS # BLD AUTO: 0.05 10*3/MM3 (ref 0–0.2)
BASOPHILS NFR BLD AUTO: 0.7 % (ref 0–1.5)
BILIRUB SERPL-MCNC: 0.4 MG/DL (ref 0–1.2)
BILIRUB UR QL STRIP: NEGATIVE
BUN SERPL-MCNC: 9 MG/DL (ref 8–23)
BUN/CREAT SERPL: 9.1 (ref 7–25)
CALCIUM SPEC-SCNC: 9.1 MG/DL (ref 8.6–10.5)
CHLORIDE SERPL-SCNC: 106 MMOL/L (ref 98–107)
CLARITY UR: CLEAR
CO2 SERPL-SCNC: 25 MMOL/L (ref 22–29)
COLOR UR: YELLOW
CREAT SERPL-MCNC: 0.99 MG/DL (ref 0.57–1)
DEPRECATED RDW RBC AUTO: 47.7 FL (ref 37–54)
EOSINOPHIL # BLD AUTO: 0.1 10*3/MM3 (ref 0–0.4)
EOSINOPHIL NFR BLD AUTO: 1.3 % (ref 0.3–6.2)
ERYTHROCYTE [DISTWIDTH] IN BLOOD BY AUTOMATED COUNT: 13.6 % (ref 12.3–15.4)
GFR SERPL CREATININE-BSD FRML MDRD: 54 ML/MIN/1.73
GLOBULIN UR ELPH-MCNC: 2.8 GM/DL
GLUCOSE BLDC GLUCOMTR-MCNC: 138 MG/DL (ref 70–130)
GLUCOSE BLDC GLUCOMTR-MCNC: 244 MG/DL (ref 70–130)
GLUCOSE SERPL-MCNC: 125 MG/DL (ref 65–99)
GLUCOSE UR STRIP-MCNC: NEGATIVE MG/DL
HCT VFR BLD AUTO: 39.6 % (ref 34–46.6)
HGB BLD-MCNC: 13.4 G/DL (ref 12–15.9)
HGB UR QL STRIP.AUTO: ABNORMAL
HYALINE CASTS UR QL AUTO: ABNORMAL /LPF
IMM GRANULOCYTES # BLD AUTO: 0.04 10*3/MM3 (ref 0–0.05)
IMM GRANULOCYTES NFR BLD AUTO: 0.5 % (ref 0–0.5)
KETONES UR QL STRIP: NEGATIVE
LEUKOCYTE ESTERASE UR QL STRIP.AUTO: NEGATIVE
LYMPHOCYTES # BLD AUTO: 0.83 10*3/MM3 (ref 0.7–3.1)
LYMPHOCYTES NFR BLD AUTO: 10.8 % (ref 19.6–45.3)
MAGNESIUM SERPL-MCNC: 1.4 MG/DL (ref 1.6–2.4)
MCH RBC QN AUTO: 32.1 PG (ref 26.6–33)
MCHC RBC AUTO-ENTMCNC: 33.8 G/DL (ref 31.5–35.7)
MCV RBC AUTO: 95 FL (ref 79–97)
MONOCYTES # BLD AUTO: 0.36 10*3/MM3 (ref 0.1–0.9)
MONOCYTES NFR BLD AUTO: 4.7 % (ref 5–12)
NEUTROPHILS NFR BLD AUTO: 6.31 10*3/MM3 (ref 1.7–7)
NEUTROPHILS NFR BLD AUTO: 82 % (ref 42.7–76)
NITRITE UR QL STRIP: NEGATIVE
NRBC BLD AUTO-RTO: 0 /100 WBC (ref 0–0.2)
PH UR STRIP.AUTO: 6 [PH] (ref 5–8)
PLATELET # BLD AUTO: 285 10*3/MM3 (ref 140–450)
PMV BLD AUTO: 9.2 FL (ref 6–12)
POTASSIUM SERPL-SCNC: 4 MMOL/L (ref 3.5–5.2)
PROT SERPL-MCNC: 6.8 G/DL (ref 6–8.5)
PROT UR QL STRIP: NEGATIVE
RBC # BLD AUTO: 4.17 10*6/MM3 (ref 3.77–5.28)
RBC # UR: ABNORMAL /HPF
REF LAB TEST METHOD: ABNORMAL
SODIUM SERPL-SCNC: 143 MMOL/L (ref 136–145)
SP GR UR STRIP: 1.02 (ref 1–1.03)
SQUAMOUS #/AREA URNS HPF: ABNORMAL /HPF
TROPONIN T SERPL-MCNC: <0.01 NG/ML (ref 0–0.03)
UROBILINOGEN UR QL STRIP: ABNORMAL
WBC # BLD AUTO: 7.69 10*3/MM3 (ref 3.4–10.8)
WBC UR QL AUTO: ABNORMAL /HPF

## 2021-06-04 PROCEDURE — 81001 URINALYSIS AUTO W/SCOPE: CPT | Performed by: EMERGENCY MEDICINE

## 2021-06-04 PROCEDURE — 83735 ASSAY OF MAGNESIUM: CPT | Performed by: EMERGENCY MEDICINE

## 2021-06-04 PROCEDURE — 25010000002 MAGNESIUM SULFATE IN D5W 1G/100ML (PREMIX) 1-5 GM/100ML-% SOLUTION: Performed by: EMERGENCY MEDICINE

## 2021-06-04 PROCEDURE — 84484 ASSAY OF TROPONIN QUANT: CPT | Performed by: EMERGENCY MEDICINE

## 2021-06-04 PROCEDURE — 71045 X-RAY EXAM CHEST 1 VIEW: CPT

## 2021-06-04 PROCEDURE — 82962 GLUCOSE BLOOD TEST: CPT

## 2021-06-04 PROCEDURE — 96365 THER/PROPH/DIAG IV INF INIT: CPT

## 2021-06-04 PROCEDURE — 80053 COMPREHEN METABOLIC PANEL: CPT | Performed by: EMERGENCY MEDICINE

## 2021-06-04 PROCEDURE — 85025 COMPLETE CBC W/AUTO DIFF WBC: CPT | Performed by: EMERGENCY MEDICINE

## 2021-06-04 PROCEDURE — 99283 EMERGENCY DEPT VISIT LOW MDM: CPT

## 2021-06-04 RX ORDER — MAGNESIUM SULFATE 1 G/100ML
1 INJECTION INTRAVENOUS ONCE
Status: COMPLETED | OUTPATIENT
Start: 2021-06-04 | End: 2021-06-04

## 2021-06-04 RX ORDER — SULFAMETHOXAZOLE AND TRIMETHOPRIM 800; 160 MG/1; MG/1
1 TABLET ORAL 2 TIMES DAILY
Qty: 6 TABLET | Refills: 0 | Status: SHIPPED | OUTPATIENT
Start: 2021-06-04 | End: 2021-08-05

## 2021-06-04 RX ADMIN — MAGNESIUM SULFATE HEPTAHYDRATE 1 G: 1 INJECTION, SOLUTION INTRAVENOUS at 14:27

## 2021-06-04 NOTE — ED NOTES
Pt infiltration site on left AC appears more edematous at this time. I informed Dr. Smith and he assessed the pt and requested an ace wrap be applied to the arm. I placed an ace wrap on the pts left arm from left upper arm to left mid forearm. Pt denies pain at this time.      Jesica Calderon, RN  06/04/21 [id number]

## 2021-06-04 NOTE — ED PROVIDER NOTES
EMERGENCY DEPARTMENT ENCOUNTER    Room Number:  29/29  Date of encounter:  6/4/2021  PCP: Dash Sanchez MD  Historian: Patient, daughter      HPI:  Chief Complaint: Hypoglycemia  A complete HPI/ROS/PMH/PSH/SH/FH are unobtainable due to: None    Context: Yasmin Cedeno is a 81 y.o. female who presents to the ED via [facility] from home after the daughter found her altered today, EMS reported glucose of 46, gave her 1 tube of oral glucose and an amp of D50 and recheck was 78.  Currently patient reports feeling much better than she did though somewhat tired.  States that she did not eat breakfast this morning.  States that she takes 26 units of nighttime insulin every night before bed but does not usually eat a meal and she states that she is waking up in the middle of the night having falls and disorientation.  Denies any recent fevers, chills, cough which has become short of breath, dysuria.  Patient has intermittent chronic diarrhea, did have an episode of vomiting in recent days but is not currently nauseous.  Denies any pain at this time.      MEDICAL RECORD REVIEW    Primary care provider visit April 6, 2021, showing type 2 diabetes on saxagliptin, with triage note reviewed showing 26 units of nighttime insulin as well as being on Metformin    PAST MEDICAL HISTORY  Active Ambulatory Problems     Diagnosis Date Noted   • Type 2 diabetes mellitus without complication, with long-term current use of insulin (CMS/[facility]) 09/29/2020   • Chronic kidney disease, stage III (moderate) 09/29/2020   • Vitamin D deficiency 09/29/2020   • Mixed hyperlipidemia 09/29/2020   • Hypothyroidism due to acquired atrophy of thyroid 09/29/2020   • Chronic depression 09/29/2020   • Idiopathic peripheral neuropathy 09/29/2020   • History of colon polyps 09/29/2020   • Facial rhytids 09/29/2020   • Class 1 obesity due to excess calories with serious comorbidity and body mass index (BMI) of 32.0 to 32.9 in adult 09/29/2020      Resolved Ambulatory Problems     Diagnosis Date Noted   • No Resolved Ambulatory Problems     Past Medical History:   Diagnosis Date   • AC joint arthropathy    • B12 deficiency    • Chronic back pain    • History of eczema    • History of fall    • History of urinary tract infection    • Insomnia    • Loose body of shoulder    • Low back pain with radiation    • Nonspecific reaction to tuberculin skin test without active tuberculosis    • Obesity, Class I, BMI 30-34.9    • Osteoarthritis of shoulder    • Peripheral neuropathy    • Psoriasis    • Rhytides    • Rotator cuff tear    • Spondylolysis    • Tinnitus    • Yeast infection          PAST SURGICAL HISTORY  Past Surgical History:   Procedure Laterality Date   • BACK SURGERY      discectomy L5-S1. Hodes  &  repeated. Repeat Disc surgery 2003.   • CATARACT EXTRACTION      Left, Zoran S/p Cataract Rt    • CHOLECYSTECTOMY  1999    Dominguez   • COLONOSCOPY     • TONSILLECTOMY  194         FAMILY HISTORY  Family History   Problem Relation Age of Onset   • Hypertension Mother    • Other Mother         acute myocardial infarction    • Other Father         acute myocardial infarction    • Diabetes Maternal Grandfather    • Breast cancer Other    • Liver cancer Other          SOCIAL HISTORY  Social History     Socioeconomic History   • Marital status:      Spouse name: Not on file   • Number of children: Not on file   • Years of education: Not on file   • Highest education level: Not on file   Tobacco Use   • Smoking status: Former Smoker     Quit date:      Years since quittin.4   • Smokeless tobacco: Never Used   Substance and Sexual Activity   • Alcohol use: Never   • Drug use: Never         ALLERGIES  Cefaclor and Epinephrine        REVIEW OF SYSTEMS  Review of Systems     All systems reviewed and negative except for those discussed in HPI.       PHYSICAL EXAM    I have reviewed the triage vital signs and nursing  notes.    ED Triage Vitals [06/04/21 1305]   Temp Heart Rate Resp BP SpO2   97.4 °F (36.3 °C) 100 16 174/83 96 %      Temp src Heart Rate Source Patient Position BP Location FiO2 (%)   -- -- -- -- --       Physical Exam  General: Awake, alert, no acute distress  HEENT: Mucous membranes tacky, atraumatic, EOMI  Neck: Full ROM  Pulm: Symmetric chest rise, nonlabored, lungs CTAB  Cardiovascular: Regular rate and rhythm, intact distal pulses, no evidence of IV infiltration the left upper extremity from EMS IV line with localized edema in the left AC fossa without erythema, heat, or purulence  GI: Soft, nontender, nondistended, no rebound, no guarding, bowel sounds present  MSK: Full ROM, no deformity, faint superficial abrasions and ecchymosis to the knees bilaterally though intact and full range of motion without significant discomfort  Skin: Warm, dry  Neuro: Alert and oriented x 3, GCS 15, cranial nerves II through XII intact, 5-5 strength sensation bilateral upper and lower extremity, no dysarthria or aphasia, moving all extremities, no focal deficits  Psych: Calm, cooperative      Surgical mask, protective eye goggles, and gloves used during this encounter. Patient in surgical mask.      LAB RESULTS  Recent Results (from the past 24 hour(s))   Comprehensive Metabolic Panel    Collection Time: 06/04/21  1:34 PM    Specimen: Blood   Result Value Ref Range    Glucose 125 (H) 65 - 99 mg/dL    BUN 9 8 - 23 mg/dL    Creatinine 0.99 0.57 - 1.00 mg/dL    Sodium 143 136 - 145 mmol/L    Potassium 4.0 3.5 - 5.2 mmol/L    Chloride 106 98 - 107 mmol/L    CO2 25.0 22.0 - 29.0 mmol/L    Calcium 9.1 8.6 - 10.5 mg/dL    Total Protein 6.8 6.0 - 8.5 g/dL    Albumin 4.00 3.50 - 5.20 g/dL    ALT (SGPT) 11 1 - 33 U/L    AST (SGOT) 15 1 - 32 U/L    Alkaline Phosphatase 90 39 - 117 U/L    Total Bilirubin 0.4 0.0 - 1.2 mg/dL    eGFR Non African Amer 54 (L) >60 mL/min/1.73    Globulin 2.8 gm/dL    A/G Ratio 1.4 g/dL    BUN/Creatinine Ratio  9.1 7.0 - 25.0    Anion Gap 12.0 5.0 - 15.0 mmol/L   Troponin    Collection Time: 06/04/21  1:34 PM    Specimen: Blood   Result Value Ref Range    Troponin T <0.010 0.000 - 0.030 ng/mL   Magnesium    Collection Time: 06/04/21  1:34 PM    Specimen: Blood   Result Value Ref Range    Magnesium 1.4 (L) 1.6 - 2.4 mg/dL   CBC Auto Differential    Collection Time: 06/04/21  1:34 PM    Specimen: Blood   Result Value Ref Range    WBC 7.69 3.40 - 10.80 10*3/mm3    RBC 4.17 3.77 - 5.28 10*6/mm3    Hemoglobin 13.4 12.0 - 15.9 g/dL    Hematocrit 39.6 34.0 - 46.6 %    MCV 95.0 79.0 - 97.0 fL    MCH 32.1 26.6 - 33.0 pg    MCHC 33.8 31.5 - 35.7 g/dL    RDW 13.6 12.3 - 15.4 %    RDW-SD 47.7 37.0 - 54.0 fl    MPV 9.2 6.0 - 12.0 fL    Platelets 285 140 - 450 10*3/mm3    Neutrophil % 82.0 (H) 42.7 - 76.0 %    Lymphocyte % 10.8 (L) 19.6 - 45.3 %    Monocyte % 4.7 (L) 5.0 - 12.0 %    Eosinophil % 1.3 0.3 - 6.2 %    Basophil % 0.7 0.0 - 1.5 %    Immature Grans % 0.5 0.0 - 0.5 %    Neutrophils, Absolute 6.31 1.70 - 7.00 10*3/mm3    Lymphocytes, Absolute 0.83 0.70 - 3.10 10*3/mm3    Monocytes, Absolute 0.36 0.10 - 0.90 10*3/mm3    Eosinophils, Absolute 0.10 0.00 - 0.40 10*3/mm3    Basophils, Absolute 0.05 0.00 - 0.20 10*3/mm3    Immature Grans, Absolute 0.04 0.00 - 0.05 10*3/mm3    nRBC 0.0 0.0 - 0.2 /100 WBC   Urinalysis With Microscopic If Indicated (No Culture) - Urine, Catheter    Collection Time: 06/04/21  1:44 PM    Specimen: Urine, Catheter   Result Value Ref Range    Color, UA Yellow Yellow, Straw    Appearance, UA Clear Clear    pH, UA 6.0 5.0 - 8.0    Specific Gravity, UA 1.017 1.005 - 1.030    Glucose, UA Negative Negative    Ketones, UA Negative Negative    Bilirubin, UA Negative Negative    Blood, UA Small (1+) (A) Negative    Protein, UA Negative Negative    Leuk Esterase, UA Negative Negative    Nitrite, UA Negative Negative    Urobilinogen, UA 1.0 E.U./dL 0.2 - 1.0 E.U./dL   Urinalysis, Microscopic Only - Urine, Catheter     Collection Time: 06/04/21  1:44 PM    Specimen: Urine, Catheter   Result Value Ref Range    RBC, UA 0-2 None Seen, 0-2 /HPF    WBC, UA 3-5 (A) None Seen, 0-2 /HPF    Bacteria, UA 4+ (A) None Seen /HPF    Squamous Epithelial Cells, UA 0-2 None Seen, 0-2 /HPF    Hyaline Casts, UA 3-6 None Seen /LPF    Methodology Automated Microscopy    POC Glucose Once    Collection Time: 06/04/21  1:49 PM    Specimen: Blood   Result Value Ref Range    Glucose 138 (H) 70 - 130 mg/dL   POC Glucose Once    Collection Time: 06/04/21  2:50 PM    Specimen: Blood   Result Value Ref Range    Glucose 244 (H) 70 - 130 mg/dL       Ordered the above labs and independently reviewed the results.        RADIOLOGY  XR Chest 1 View    Result Date: 6/4/2021  XR CHEST 1 VW-  HISTORY: Female who is 81 years-old,  vomiting, cough  TECHNIQUE: Frontal view of the chest  COMPARISON: None available  FINDINGS: Heart size is normal. Aorta appears tortuous. Pulmonary vasculature is unremarkable. A 1 cm nodular density projecting between the left anterior fifth and sixth ribs at the left base corresponds to a calcified nodular density at the left base on the CT from 09/29/2002. No focal pulmonary consolidation, pleural effusion, or pneumothorax. No acute osseous process.      No evidence for acute pulmonary process. Tortuous appearing aorta. Follow-up as clinically indicated.  This report was finalized on 6/4/2021 3:15 PM by Dr. Bakari Remy M.D.        I ordered the above noted radiological studies. Reviewed by me.  See dictation for official radiology interpretation.      PROCEDURES    Procedures      MEDICATIONS GIVEN IN ER    Medications   magnesium sulfate in D5W 1g/100mL (PREMIX) (0 g Intravenous Stopped 6/4/21 1534)         PROGRESS, DATA ANALYSIS, CONSULTS, AND MEDICAL DECISION MAKING    All labs have been independently reviewed by me.  All radiology studies have been reviewed by me and discussed with radiologist dictating the report.   EKG's  independently viewed and interpreted by me.  Discussion below represents my analysis of pertinent findings related to patient's condition, differential diagnosis, treatment plan and final disposition.    Initial concern for hypoglycemia, renal failure, UTI, pneumonia, electrolyte abnormalities, among others.    ED Course as of Jun 04 2041 Fri Jun 04, 2021   1419 WBC: 7.69 [DC]   1419 Hemoglobin: 13.4 [DC]   1419 Nitrite, UA: Negative [DC]   1419 Leukocytes, UA: Negative [DC]   1419 Magnesium(!): 1.4 [DC]   1419 Troponin T: <0.010 [DC]   1419 Bacteria, UA(!): 4+ [DC]   1419 WBC, UA(!): 3-5 [DC]   1419 Glucose(!): 125 [DC]   1419 Creatinine: 0.99 [DC]   1421 Patient has reported some increased urinary urgency recently with intermittent dysuria, based on urinary findings which are kind of borderline without nitrites or leukocyte Estrace but does have 4+ bacteria and 3-5 white blood cells, I will treat with a short course of antibiotics.    [DC]   1520 XR Chest 1 View [DC]      ED Course User Index  [DC] Mil Smith MD       AS OF 20:41 EDT VITALS:    BP - 125/82  HR - 96  TEMP - 97.4 °F (36.3 °C)  02 SATS - 95%        DIAGNOSIS  Final diagnoses:   Hypoglycemic event in diabetes (CMS/[facility])   Hypomagnesemia   Urinary tract infection in female         DISPOSITION  DISCHARGE    Patient discharged in stable condition.    Reviewed implications of results, diagnosis, meds, responsibility to follow up, warning signs and symptoms of possible worsening, potential complications and reasons to return to ER.    Patient/Family voiced understanding of above instructions.    Discussed plan for discharge, as there is no emergent indication for admission. Patient referred to primary care provider for BP management due to today's BP. Pt/family is agreeable and understands need for follow up and repeat testing.  Pt is aware that discharge does not mean that nothing is wrong but it indicates no emergency is present that requires  admission and they must continue care with follow-up as given below or physician of their choice.     FOLLOW-UP  [facility] Emergency Department  [address] [address] [phone]    As needed, If symptoms worsen    Dash Sanchez MD  [address] [id number] [phone]    Call on 6/7/2021  to set up close follow up appointment         Medication List      New Prescriptions    sulfamethoxazole-trimethoprim 800-160 MG per tablet  Commonly known as: BACTRIM DS,SEPTRA DS  Take 1 tablet by mouth 2 (Two) Times a Day. Drink plenty of water           Where to Get Your Medications      You can get these medications from any pharmacy    Bring a paper prescription for each of these medications  · sulfamethoxazole-trimethoprim 800-160 MG per tablet                    Mil Smith MD  06/04/21 2048

## 2021-06-04 NOTE — ED NOTES
Pt to ED room 29 via EMS. A&Ox4. Pt reports that she was at home and woke up with a bunch of people in her house. I did not receive report from EMS but pt says that they did give her something for low blood sugar on her way to the hospital. Pt had an IV in the left AC that appears red and infiltrated so I removed this during pt assessment. Pt reports pain in the back of her head that she describes as throbbing and rates at a 5/10. Pt reports that she takes insulin at bedtime that she thinks is 26 units and that she has been falling recently and her dr thinks it is because her blood sugar gets low. Pt says she is unsure if she ate dinner last night or not. Pt denies chest pain, soa, nausea, vomiting and diarrhea. No other complaints at this time.      Jesica Calderon, RN  06/04/21 0875

## 2021-06-04 NOTE — DISCHARGE INSTRUCTIONS
Be sure that you are eating a meal when taking her insulin, eat regular meals, close follow-up with your primary care provider next week for recheck, ED return for worsening symptoms as needed.  Complete course of antibiotics as prescribed.

## 2021-06-04 NOTE — ED TRIAGE NOTES
Pt from home family found patient to be altered EMS reports glucose was 46, pt takes metformin, not insulin, ems gave patient 1 tube of oral glucose and amp d50 recheck was 78.    Patient masked in first look triage. I was wearing mask and goggles.

## 2021-06-08 ENCOUNTER — OFFICE VISIT (OUTPATIENT)
Dept: INTERNAL MEDICINE | Facility: CLINIC | Age: 81
End: 2021-06-08

## 2021-06-08 VITALS
SYSTOLIC BLOOD PRESSURE: 102 MMHG | WEIGHT: 163 LBS | BODY MASS INDEX: 32 KG/M2 | HEIGHT: 60 IN | DIASTOLIC BLOOD PRESSURE: 80 MMHG | TEMPERATURE: 97.8 F

## 2021-06-08 DIAGNOSIS — Z79.4 TYPE 2 DIABETES MELLITUS WITHOUT COMPLICATION, WITH LONG-TERM CURRENT USE OF INSULIN (HCC): Primary | Chronic | ICD-10-CM

## 2021-06-08 DIAGNOSIS — E11.9 TYPE 2 DIABETES MELLITUS WITHOUT COMPLICATION, WITH LONG-TERM CURRENT USE OF INSULIN (HCC): Primary | Chronic | ICD-10-CM

## 2021-06-08 PROCEDURE — 99213 OFFICE O/P EST LOW 20 MIN: CPT | Performed by: INTERNAL MEDICINE

## 2021-06-08 RX ORDER — GLIPIZIDE 10 MG/1
10 TABLET, FILM COATED, EXTENDED RELEASE ORAL DAILY
Qty: 30 TABLET | Refills: 3
Start: 2021-06-08 | End: 2021-08-05

## 2021-06-14 RX ORDER — LEVOTHYROXINE SODIUM 88 UG/1
88 TABLET ORAL DAILY
Qty: 90 TABLET | Refills: 0 | Status: SHIPPED | OUTPATIENT
Start: 2021-06-14 | End: 2021-11-18 | Stop reason: SDUPTHER

## 2021-06-15 NOTE — PROGRESS NOTES
"Chief Complaint   Patient presents with   • Colon Cancer Screening     history of polyps         History of Present Illness  Patient for evaluation for possible colonoscopy due to personal history of colon polyps. Her last colonoscopy was performed at [facility] with Dr. Hackett April 2016 with findings of one colon polyp and diverticulosis. She denies any family history of colon cancer. She denies any change in bowel habits or rectal bleeding. She denies any abdominal pain.     She reports a recent fall out of her bed during the night while she was sleeping. She reports that she did not wake up and EMS was called. Her blood sugar was 40 and she was taken to the hospital for evaluation.        Result Review :       Comprehensive Metabolic Panel (06/04/2021 13:34)  CBC & Differential (06/04/2021 13:34)  Hepatitis C Antibody (04/06/2021 10:38)      Vital Signs:   /70   Pulse 110   Temp 97.3 °F (36.3 °C)   Resp 16   Ht 152.4 cm (60\")   Wt 73.6 kg (162 lb 4.8 oz)   SpO2 95%   BMI 31.70 kg/m²     Body mass index is 31.7 kg/m².     Physical Exam  Vitals reviewed.   Constitutional:       Appearance: Normal appearance.   Abdominal:      General: Bowel sounds are normal. There is no distension.      Palpations: Abdomen is soft. Abdomen is not rigid. There is no mass or pulsatile mass.      Tenderness: There is no abdominal tenderness. There is no guarding or rebound.       Assessment and Plan    Diagnoses and all orders for this visit:    1. History of colon polyps (Primary)    2. Colon cancer screening       Documentation by Tamra CUEVAS acting as a scribe in the following sections (HPI, Assessment, Plan) for the undersigned provider.     BRIEF SUMMARY  Patient for evaluation for colonoscopy. She apparently had a colonoscopy in 2016 had a polyp removed. We will send for records from [facility] to review. Given her advanced age and medical fragility, I do not think I would necessarily push for a " surveillance colonoscopy at this point. We will make further recommendations pending review of prior endoscopy and pathology.    I have reviewed and confirmed the accuracy of the HPI and Assessment and Plan as documented by the APRN MASON Sow        Follow Up   No follow-ups on file.    Patient Instructions   1. For colon cancer screening, we will request your previous colonoscopy and pathology report for our review to determine if you are due for a colonoscopy. We will contact you once we have reviewed this and communicate our recommendations based upon report findings.

## 2021-06-16 ENCOUNTER — OFFICE VISIT (OUTPATIENT)
Dept: GASTROENTEROLOGY | Facility: CLINIC | Age: 81
End: 2021-06-16

## 2021-06-16 VITALS
DIASTOLIC BLOOD PRESSURE: 70 MMHG | WEIGHT: 162.3 LBS | BODY MASS INDEX: 31.86 KG/M2 | OXYGEN SATURATION: 95 % | SYSTOLIC BLOOD PRESSURE: 108 MMHG | TEMPERATURE: 97.3 F | HEART RATE: 110 BPM | HEIGHT: 60 IN | RESPIRATION RATE: 16 BRPM

## 2021-06-16 DIAGNOSIS — Z12.11 COLON CANCER SCREENING: ICD-10-CM

## 2021-06-16 DIAGNOSIS — Z86.010 HISTORY OF COLON POLYPS: Primary | ICD-10-CM

## 2021-06-16 PROCEDURE — S0260 H&P FOR SURGERY: HCPCS | Performed by: INTERNAL MEDICINE

## 2021-06-16 NOTE — PATIENT INSTRUCTIONS
1. For colon cancer screening, we will request your previous colonoscopy and pathology report for our review to determine if you are due for a colonoscopy. We will contact you once we have reviewed this and communicate our recommendations based upon report findings.

## 2021-06-17 ENCOUNTER — TELEPHONE (OUTPATIENT)
Dept: INTERNAL MEDICINE | Facility: CLINIC | Age: 81
End: 2021-06-17

## 2021-06-17 DIAGNOSIS — E11.9 TYPE 2 DIABETES MELLITUS WITHOUT COMPLICATION, WITH LONG-TERM CURRENT USE OF INSULIN (HCC): ICD-10-CM

## 2021-06-17 DIAGNOSIS — Z79.4 TYPE 2 DIABETES MELLITUS WITHOUT COMPLICATION, WITH LONG-TERM CURRENT USE OF INSULIN (HCC): ICD-10-CM

## 2021-06-17 RX ORDER — METFORMIN HYDROCHLORIDE 1000 MG/1
1000 TABLET, FILM COATED, EXTENDED RELEASE ORAL 2 TIMES DAILY
Qty: 180 TABLET | Refills: 1 | Status: SHIPPED | OUTPATIENT
Start: 2021-06-17 | End: 2021-08-01 | Stop reason: SDUPTHER

## 2021-06-17 RX ORDER — LEVOTHYROXINE SODIUM 0.1 MG/1
100 TABLET ORAL EVERY OTHER DAY
COMMUNITY
End: 2021-08-05

## 2021-06-17 NOTE — TELEPHONE ENCOUNTER
Hub staff attempted to follow warm transfer process and was unsuccessful     Caller: [facility] - [place], [place] - [address] AT [address]  - [phone] [facility] [phone] FX    Relationship to patient: Pharmacy    Best call back number: [phone]    Patient is needing: PHARMACY CALLED TO GET CLARIFICATION ON THE levothyroxine (SYNTHROID, LEVOTHROID) 88 MCG tablet, REQUESTING CLINICAL REACH BACK OUT TO THEM AT THEIR CONVENIENCE.

## 2021-06-17 NOTE — TELEPHONE ENCOUNTER
I want her to take how she was taking when had labs earlier this year.    Please update on med list

## 2021-07-09 RX ORDER — ATORVASTATIN CALCIUM 40 MG/1
TABLET, FILM COATED ORAL
Qty: 30 TABLET | Refills: 0 | OUTPATIENT
Start: 2021-07-09

## 2021-07-12 RX ORDER — ATORVASTATIN CALCIUM 40 MG/1
TABLET, FILM COATED ORAL
Qty: 30 TABLET | Refills: 2 | Status: SHIPPED | OUTPATIENT
Start: 2021-07-12 | End: 2021-12-02 | Stop reason: SDUPTHER

## 2021-07-29 DIAGNOSIS — E11.9 TYPE 2 DIABETES MELLITUS WITHOUT COMPLICATION, WITH LONG-TERM CURRENT USE OF INSULIN (HCC): Chronic | ICD-10-CM

## 2021-07-29 DIAGNOSIS — Z79.4 TYPE 2 DIABETES MELLITUS WITHOUT COMPLICATION, WITH LONG-TERM CURRENT USE OF INSULIN (HCC): Chronic | ICD-10-CM

## 2021-07-29 DIAGNOSIS — N18.31 STAGE 3A CHRONIC KIDNEY DISEASE (HCC): ICD-10-CM

## 2021-07-29 DIAGNOSIS — E03.4 HYPOTHYROIDISM DUE TO ACQUIRED ATROPHY OF THYROID: Chronic | ICD-10-CM

## 2021-07-30 LAB
ALBUMIN SERPL-MCNC: 4.6 G/DL (ref 3.5–5.2)
ALBUMIN/CREAT UR: 19 MG/G CREAT (ref 0–29)
ALBUMIN/GLOB SERPL: 2.1 G/DL
ALP SERPL-CCNC: 88 U/L (ref 39–117)
ALT SERPL-CCNC: 9 U/L (ref 1–33)
AST SERPL-CCNC: 14 U/L (ref 1–32)
BILIRUB SERPL-MCNC: 0.5 MG/DL (ref 0–1.2)
BUN SERPL-MCNC: 13 MG/DL (ref 8–23)
BUN/CREAT SERPL: 9.8 (ref 7–25)
CALCIUM SERPL-MCNC: 9.7 MG/DL (ref 8.6–10.5)
CHLORIDE SERPL-SCNC: 103 MMOL/L (ref 98–107)
CHOLEST SERPL-MCNC: 138 MG/DL (ref 0–200)
CHOLEST/HDLC SERPL: 3 {RATIO}
CO2 SERPL-SCNC: 22.5 MMOL/L (ref 22–29)
CREAT SERPL-MCNC: 1.33 MG/DL (ref 0.57–1)
CREAT UR-MCNC: 266 MG/DL
GLOBULIN SER CALC-MCNC: 2.2 GM/DL
GLUCOSE SERPL-MCNC: 67 MG/DL (ref 65–99)
HBA1C MFR BLD: 6.6 % (ref 4.8–5.6)
HDLC SERPL-MCNC: 46 MG/DL (ref 40–60)
LDLC SERPL CALC-MCNC: 64 MG/DL (ref 0–100)
MICROALBUMIN UR-MCNC: 51.2 UG/ML
POTASSIUM SERPL-SCNC: 4.4 MMOL/L (ref 3.5–5.2)
PROT SERPL-MCNC: 6.8 G/DL (ref 6–8.5)
SODIUM SERPL-SCNC: 140 MMOL/L (ref 136–145)
TRIGL SERPL-MCNC: 164 MG/DL (ref 0–150)
TSH SERPL DL<=0.005 MIU/L-ACNC: 0.38 UIU/ML (ref 0.27–4.2)
VLDLC SERPL CALC-MCNC: 28 MG/DL (ref 5–40)

## 2021-08-01 DIAGNOSIS — E11.9 TYPE 2 DIABETES MELLITUS WITHOUT COMPLICATION, WITH LONG-TERM CURRENT USE OF INSULIN (HCC): ICD-10-CM

## 2021-08-01 DIAGNOSIS — G60.9 IDIOPATHIC PERIPHERAL NEUROPATHY: Chronic | ICD-10-CM

## 2021-08-01 DIAGNOSIS — Z79.4 TYPE 2 DIABETES MELLITUS WITHOUT COMPLICATION, WITH LONG-TERM CURRENT USE OF INSULIN (HCC): ICD-10-CM

## 2021-08-02 RX ORDER — METFORMIN HYDROCHLORIDE 1000 MG/1
1000 TABLET, FILM COATED, EXTENDED RELEASE ORAL 2 TIMES DAILY
Qty: 180 TABLET | Refills: 1 | Status: SHIPPED | OUTPATIENT
Start: 2021-08-02 | End: 2022-08-15

## 2021-08-02 RX ORDER — INSULIN GLARGINE 100 [IU]/ML
26 INJECTION, SOLUTION SUBCUTANEOUS DAILY
Qty: 15 ML | Refills: 2 | Status: SHIPPED | OUTPATIENT
Start: 2021-08-02 | End: 2021-12-06

## 2021-08-02 RX ORDER — GABAPENTIN 300 MG/1
CAPSULE ORAL
Qty: 180 CAPSULE | Refills: 1 | Status: SHIPPED | OUTPATIENT
Start: 2021-08-02 | End: 2022-08-01

## 2021-08-05 ENCOUNTER — OFFICE VISIT (OUTPATIENT)
Dept: INTERNAL MEDICINE | Facility: CLINIC | Age: 81
End: 2021-08-05

## 2021-08-05 VITALS
HEIGHT: 60 IN | BODY MASS INDEX: 31.92 KG/M2 | SYSTOLIC BLOOD PRESSURE: 124 MMHG | TEMPERATURE: 97.8 F | DIASTOLIC BLOOD PRESSURE: 82 MMHG | WEIGHT: 162.6 LBS

## 2021-08-05 DIAGNOSIS — E78.2 MIXED HYPERLIPIDEMIA: Chronic | ICD-10-CM

## 2021-08-05 DIAGNOSIS — E03.4 HYPOTHYROIDISM DUE TO ACQUIRED ATROPHY OF THYROID: Chronic | ICD-10-CM

## 2021-08-05 DIAGNOSIS — F32.A CHRONIC DEPRESSION: Chronic | ICD-10-CM

## 2021-08-05 DIAGNOSIS — E11.9 TYPE 2 DIABETES MELLITUS WITHOUT COMPLICATION, WITH LONG-TERM CURRENT USE OF INSULIN (HCC): Chronic | ICD-10-CM

## 2021-08-05 DIAGNOSIS — Z00.00 MEDICARE ANNUAL WELLNESS VISIT, SUBSEQUENT: Primary | ICD-10-CM

## 2021-08-05 DIAGNOSIS — Z79.4 TYPE 2 DIABETES MELLITUS WITHOUT COMPLICATION, WITH LONG-TERM CURRENT USE OF INSULIN (HCC): Chronic | ICD-10-CM

## 2021-08-05 DIAGNOSIS — N18.30 STAGE 3 CHRONIC KIDNEY DISEASE, UNSPECIFIED WHETHER STAGE 3A OR 3B CKD (HCC): Chronic | ICD-10-CM

## 2021-08-05 PROCEDURE — 1170F FXNL STATUS ASSESSED: CPT | Performed by: INTERNAL MEDICINE

## 2021-08-05 PROCEDURE — 96160 PT-FOCUSED HLTH RISK ASSMT: CPT | Performed by: INTERNAL MEDICINE

## 2021-08-05 PROCEDURE — 1159F MED LIST DOCD IN RCRD: CPT | Performed by: INTERNAL MEDICINE

## 2021-08-05 PROCEDURE — G0439 PPPS, SUBSEQ VISIT: HCPCS | Performed by: INTERNAL MEDICINE

## 2021-08-05 NOTE — PROGRESS NOTES
The ABCs of the Annual Wellness Visit  Subsequent Medicare Wellness Visit    Chief Complaint   Patient presents with   • Medicare Wellness-subsequent       Subjective   History of Present Illness:  Yasmin Cedeno is a 81 y.o. female who presents for a Subsequent Medicare Wellness Visit.  Her sugar this am was 116. She has not been checking her BP. She denies chest pain or dyspnea. She is doing well emotionally. She met with Dr. Gunderson and they decided not to proceed with a cscope based on her age. She had a blood sugar to 68 about a week ago. She was in the ER earlier this year for falling after having a low blood sugar. Food has tasted stale since getting her COVID shots.  HEALTH RISK ASSESSMENT    Recent Hospitalizations:  No hospitalization(s) within the last year.    Current Medical Providers:  Patient Care Team:  Dash Sanchez MD as PCP - General (Internal Medicine)    Smoking Status:  Social History     Tobacco Use   Smoking Status Former Smoker   • Quit date:    • Years since quittin.6   Smokeless Tobacco Never Used       Alcohol Consumption:  Social History     Substance and Sexual Activity   Alcohol Use Never       Depression Screen:   PHQ-2/PHQ-9 Depression Screening 2021   Little interest or pleasure in doing things 0   Feeling down, depressed, or hopeless 0   Total Score 0       Fall Risk Screen:  STEADI Fall Risk Assessment was completed, and patient is at HIGH risk for falls. Assessment completed on:2021    Health Habits and Functional and Cognitive Screening:  Functional & Cognitive Status 2021   Do you have difficulty preparing food and eating? No   Do you have difficulty bathing yourself, getting dressed or grooming yourself? No   Do you have difficulty using the toilet? No   Do you have difficulty moving around from place to place? No   Do you have trouble with steps or getting out of a bed or a chair? No   Current Diet Well Balanced Diet   Dental Exam Up to date   Eye  Exam Up to date   Exercise (times per week) 0 times per week   Current Exercises Include No Regular Exercise   Do you need help using the phone?  No   Are you deaf or do you have serious difficulty hearing?  No   Do you need help with transportation? No   Do you need help shopping? No   Do you need help preparing meals?  No   Do you need help with housework?  No   Do you need help with laundry? No   Do you need help taking your medications? No   Do you need help managing money? No   Do you ever drive or ride in a car without wearing a seat belt? No   Have you felt unusual stress, anger or loneliness in the last month? No   Who do you live with? Child   If you need help, do you have trouble finding someone available to you? No   Have you been bothered in the last four weeks by sexual problems? No   Do you have difficulty concentrating, remembering or making decisions? No         Does the patient have evidence of cognitive impairment? No    Asprin use counseling:Taking ASA appropriately as indicated    Age-appropriate Screening Schedule:  Refer to the list below for future screening recommendations based on patient's age, sex and/or medical conditions. Orders for these recommended tests are listed in the plan section. The patient has been provided with a written plan.    Health Maintenance   Topic Date Due   • DXA SCAN  Never done   • TDAP/TD VACCINES (1 - Tdap) Never done   • INFLUENZA VACCINE  10/01/2021   • DIABETIC EYE EXAM  11/16/2021   • HEMOGLOBIN A1C  01/29/2022   • LIPID PANEL  07/29/2022   • URINE MICROALBUMIN  07/29/2022   • ZOSTER VACCINE  Completed          The following portions of the patient's history were reviewed and updated as appropriate: allergies, current medications, past family history, past medical history, past social history, past surgical history and problem list.    Outpatient Medications Prior to Visit   Medication Sig Dispense Refill   • aspirin (aspirin) 81 MG EC tablet Take 81 mg by  mouth Daily.     • atorvastatin (LIPITOR) 40 MG tablet TAKE ONE TABLET BY MOUTH DAILY 30 tablet 2   • B-D UF III MINI PEN NEEDLES 31G X 5 MM misc Use daily 50 each 3   • DULoxetine (CYMBALTA) 30 MG capsule Take 1 capsule by mouth Daily. 90 capsule 3   • DULoxetine (CYMBALTA) 60 MG capsule Take 1 capsule by mouth Daily. 90 capsule 3   • gabapentin (NEURONTIN) 300 MG capsule 2 TABS PO QHS AS NEEDED 180 capsule 1   • Insulin Glargine (Lantus SoloStar) 100 UNIT/ML injection pen Inject 26 Units under the skin into the appropriate area as directed Daily. 15 mL 2   • levothyroxine (SYNTHROID, LEVOTHROID) 112 MCG tablet Take 112 mcg by mouth Every Other Day.     • levothyroxine (SYNTHROID, LEVOTHROID) 88 MCG tablet Take 1 tablet by mouth Daily. Take one tab qod 90 tablet 0   • lisinopril (PRINIVIL,ZESTRIL) 2.5 MG tablet Take 1 tablet by mouth Daily. 90 tablet 0   • metFORMIN (GLUMETZA) 1000 MG (MOD) 24 hr tablet Take 1 tablet by mouth 2 (Two) Times a Day. 180 tablet 1   • OXcarbazepine (TRILEPTAL) 150 MG tablet TAKE ONE TABLET BY MOUTH TWICE A  tablet 0   • SAXagliptin (Onglyza) 5 MG tablet Take 1 tablet by mouth Daily. 90 tablet 3   • vitamin B-12 (CYANOCOBALAMIN) 1000 MCG tablet Take 1,000 mcg by mouth Daily.     • glipizide (GLUCOTROL XL) 10 MG 24 hr tablet Take 1 tablet by mouth Daily. 30 tablet 3   • levothyroxine (SYNTHROID, LEVOTHROID) 100 MCG tablet Take 100 mcg by mouth Every Other Day.     • sulfamethoxazole-trimethoprim (BACTRIM DS,SEPTRA DS) 800-160 MG per tablet Take 1 tablet by mouth 2 (Two) Times a Day. Drink plenty of water 6 tablet 0     No facility-administered medications prior to visit.       Patient Active Problem List   Diagnosis   • Type 2 diabetes mellitus without complication, with long-term current use of insulin (CMS/HCC)   • Chronic kidney disease, stage III (moderate)   • Vitamin D deficiency   • Mixed hyperlipidemia   • Hypothyroidism due to acquired atrophy of thyroid   • Chronic  "depression   • Idiopathic peripheral neuropathy   • History of colon polyps   • Facial rhytids   • Class 1 obesity due to excess calories with serious comorbidity and body mass index (BMI) of 32.0 to 32.9 in adult       Advanced Care Planning:  ACP discussion was held with the patient during this visit. Patient does not have an advance directive, information provided.    Review of Systems    Compared to one year ago, the patient feels her physical health is better.  Compared to one year ago, the patient feels her mental health is the same.    Reviewed chart for potential of high risk medication in the elderly: yes  Reviewed chart for potential of harmful drug interactions in the elderly:yes    Objective         Vitals:    08/05/21 0831   BP: 124/82   Temp: 97.8 °F (36.6 °C)   Weight: 73.8 kg (162 lb 9.6 oz)   Height: 152.4 cm (60\")       Body mass index is 31.76 kg/m².  Discussed the patient's BMI with her. The BMI is above average; BMI management plan is completed.    Physical Exam  Vitals reviewed.   Constitutional:       Appearance: She is well-developed.   HENT:      Head: Normocephalic and atraumatic.   Neck:      Vascular: No carotid bruit.   Cardiovascular:      Rate and Rhythm: Normal rate and regular rhythm.      Heart sounds: Normal heart sounds, S1 normal and S2 normal.   Pulmonary:      Effort: Pulmonary effort is normal.      Breath sounds: Normal breath sounds.   Skin:     General: Skin is warm.   Neurological:      Mental Status: She is alert.   Psychiatric:         Behavior: Behavior normal.         Lab Results   Component Value Date    GLU 67 07/29/2021    CHLPL 138 07/29/2021    TRIG 164 (H) 07/29/2021    HDL 46 07/29/2021    LDL 64 07/29/2021    VLDL 28 07/29/2021    HGBA1C 6.60 (H) 07/29/2021        Assessment/Plan   Medicare Risks and Personalized Health Plan  CMS Preventative Services Quick Reference  Advance Directive Discussion  Immunizations Discussed/Encouraged (specific immunizations; Tdap " )    The above risks/problems have been discussed with the patient.  Pertinent information has been shared with the patient in the After Visit Summary.  Follow up plans and orders are seen below in the Assessment/Plan Section.    Diagnoses and all orders for this visit:    1. Medicare annual wellness visit, subsequent (Primary)    2. Mixed hyperlipidemia  Comments:  LDL is at goal    3. Type 2 diabetes mellitus without complication, with long-term current use of insulin (CMS/[facility])  Comments:  Stop glucotrol given occasional low sugar. A1c is excellent. She has an eye exam in Nov. She will call if sugars jump a lot  Orders:  -     Hemoglobin A1c; Future    4. Hypothyroidism due to acquired atrophy of thyroid  Comments:  TSH is at goal.  Orders:  -     TSH; Future    5. Chronic depression  Comments:  Stable    6. Stage 3 chronic kidney disease, unspecified whether stage 3a or 3b CKD (CMS/[facility])  Comments:  Aware not to use NSAIDS  Orders:  -     Comprehensive Metabolic Panel; Future      Follow Up:  Return in about 4 months (around 12/5/2021) for Lab Before FUP.     An After Visit Summary and PPPS were given to the patient.         Labs good. Recc Tdap at [facility].

## 2021-08-06 ENCOUNTER — HOSPITAL ENCOUNTER (OUTPATIENT)
Dept: BONE DENSITY | Facility: HOSPITAL | Age: 81
Discharge: HOME | End: 2021-08-06
Admitting: INTERNAL MEDICINE

## 2021-08-06 PROCEDURE — 77080 DXA BONE DENSITY AXIAL: CPT

## 2021-08-20 DIAGNOSIS — Z79.4 TYPE 2 DIABETES MELLITUS WITHOUT COMPLICATION, WITH LONG-TERM CURRENT USE OF INSULIN (HCC): Chronic | ICD-10-CM

## 2021-08-20 DIAGNOSIS — F32.A CHRONIC DEPRESSION: Chronic | ICD-10-CM

## 2021-08-20 DIAGNOSIS — E11.9 TYPE 2 DIABETES MELLITUS WITHOUT COMPLICATION, WITH LONG-TERM CURRENT USE OF INSULIN (HCC): Chronic | ICD-10-CM

## 2021-08-20 RX ORDER — DULOXETIN HYDROCHLORIDE 30 MG/1
30 CAPSULE, DELAYED RELEASE ORAL DAILY
Qty: 90 CAPSULE | Refills: 3 | Status: SHIPPED | OUTPATIENT
Start: 2021-08-20 | End: 2022-12-12

## 2021-08-20 RX ORDER — DULOXETIN HYDROCHLORIDE 60 MG/1
60 CAPSULE, DELAYED RELEASE ORAL DAILY
Qty: 90 CAPSULE | Refills: 3 | Status: SHIPPED | OUTPATIENT
Start: 2021-08-20 | End: 2022-11-09 | Stop reason: SDUPTHER

## 2021-10-14 RX ORDER — LISINOPRIL 2.5 MG/1
TABLET ORAL
Qty: 90 TABLET | Refills: 0 | Status: SHIPPED | OUTPATIENT
Start: 2021-10-14 | End: 2022-03-22

## 2021-11-18 ENCOUNTER — TELEPHONE (OUTPATIENT)
Dept: INTERNAL MEDICINE | Facility: CLINIC | Age: 81
End: 2021-11-18

## 2021-11-18 RX ORDER — OXCARBAZEPINE 150 MG/1
150 TABLET, FILM COATED ORAL 2 TIMES DAILY
Qty: 180 TABLET | Refills: 1 | Status: SHIPPED | OUTPATIENT
Start: 2021-11-18 | End: 2021-12-06 | Stop reason: SDUPTHER

## 2021-11-18 RX ORDER — LEVOTHYROXINE SODIUM 88 UG/1
88 TABLET ORAL DAILY
Qty: 90 TABLET | Refills: 0 | Status: SHIPPED | OUTPATIENT
Start: 2021-11-18 | End: 2021-12-02 | Stop reason: SDUPTHER

## 2021-11-18 NOTE — TELEPHONE ENCOUNTER
Find out what dose of levothyroxine she needs b/c fax said 100 mcgm and that is not what is in chart

## 2021-11-18 NOTE — TELEPHONE ENCOUNTER
Caller: Yasmin Cedeno MISSY    Relationship: Self    Best call back number: [phone] (H)      Requested Prescriptions:   Requested Prescriptions     Pending Prescriptions Disp Refills   • levothyroxine (SYNTHROID, LEVOTHROID) 88 MCG tablet 90 tablet 0     Sig: Take 1 tablet by mouth Daily. Take one tab qod   • OXcarbazepine (TRILEPTAL) 150 MG tablet 180 tablet 0     Sig: Take 1 tablet by mouth 2 (Two) Times a Day.        [place] [place] [facility] [place]  [place] [address]  - [phone]  - [phone] FX      Additional details provided by patient: ZERO REFILLS LEFT- NEEDS A NEW PRESCRIPTION WROTE FOR THESE.    PLEASE CALL AND ADVISE     Does the patient have less than a 3 day supply:  [x] Yes  [] No    Yudy Heller, PCT   11/18/21 14:25 EST       Ca

## 2021-11-22 RX ORDER — LEVOTHYROXINE SODIUM 0.1 MG/1
100 TABLET ORAL DAILY
COMMUNITY
End: 2021-12-02 | Stop reason: SDUPTHER

## 2021-11-24 DIAGNOSIS — E11.9 TYPE 2 DIABETES MELLITUS WITHOUT COMPLICATION, WITH LONG-TERM CURRENT USE OF INSULIN (HCC): Chronic | ICD-10-CM

## 2021-11-24 DIAGNOSIS — Z79.4 TYPE 2 DIABETES MELLITUS WITHOUT COMPLICATION, WITH LONG-TERM CURRENT USE OF INSULIN (HCC): Chronic | ICD-10-CM

## 2021-11-24 DIAGNOSIS — N18.30 STAGE 3 CHRONIC KIDNEY DISEASE, UNSPECIFIED WHETHER STAGE 3A OR 3B CKD (HCC): Chronic | ICD-10-CM

## 2021-11-24 DIAGNOSIS — E03.4 HYPOTHYROIDISM DUE TO ACQUIRED ATROPHY OF THYROID: Chronic | ICD-10-CM

## 2021-12-01 LAB
ALBUMIN SERPL-MCNC: 4.2 G/DL (ref 3.7–4.7)
ALBUMIN/GLOB SERPL: 1.8 {RATIO} (ref 1.2–2.2)
ALP SERPL-CCNC: 94 IU/L (ref 44–121)
ALT SERPL-CCNC: 13 IU/L (ref 0–32)
AST SERPL-CCNC: 14 IU/L (ref 0–40)
BILIRUB SERPL-MCNC: 0.5 MG/DL (ref 0–1.2)
BUN SERPL-MCNC: 16 MG/DL (ref 8–27)
BUN/CREAT SERPL: 12 (ref 12–28)
CALCIUM SERPL-MCNC: 9.1 MG/DL (ref 8.7–10.3)
CHLORIDE SERPL-SCNC: 101 MMOL/L (ref 96–106)
CO2 SERPL-SCNC: 21 MMOL/L (ref 20–29)
CREAT SERPL-MCNC: 1.33 MG/DL (ref 0.57–1)
GLOBULIN SER CALC-MCNC: 2.3 G/DL (ref 1.5–4.5)
GLUCOSE SERPL-MCNC: 106 MG/DL (ref 65–99)
HBA1C MFR BLD: 8.5 % (ref 4.8–5.6)
POTASSIUM SERPL-SCNC: 4.4 MMOL/L (ref 3.5–5.2)
PROT SERPL-MCNC: 6.5 G/DL (ref 6–8.5)
SODIUM SERPL-SCNC: 139 MMOL/L (ref 134–144)
TSH SERPL DL<=0.005 MIU/L-ACNC: 33.7 UIU/ML (ref 0.45–4.5)

## 2021-12-02 RX ORDER — ATORVASTATIN CALCIUM 40 MG/1
40 TABLET, FILM COATED ORAL DAILY
Qty: 90 TABLET | Refills: 0 | Status: SHIPPED | OUTPATIENT
Start: 2021-12-02 | End: 2022-05-04

## 2021-12-02 RX ORDER — LEVOTHYROXINE SODIUM 0.1 MG/1
100 TABLET ORAL DAILY
Qty: 90 TABLET | Refills: 0 | Status: SHIPPED | OUTPATIENT
Start: 2021-12-02 | End: 2022-08-15

## 2021-12-02 RX ORDER — LEVOTHYROXINE SODIUM 88 UG/1
88 TABLET ORAL DAILY
Qty: 90 TABLET | Refills: 0 | Status: SHIPPED | OUTPATIENT
Start: 2021-12-02 | End: 2023-03-15

## 2021-12-06 ENCOUNTER — OFFICE VISIT (OUTPATIENT)
Dept: INTERNAL MEDICINE | Facility: CLINIC | Age: 81
End: 2021-12-06

## 2021-12-06 VITALS
SYSTOLIC BLOOD PRESSURE: 104 MMHG | WEIGHT: 157 LBS | DIASTOLIC BLOOD PRESSURE: 80 MMHG | TEMPERATURE: 97.4 F | BODY MASS INDEX: 30.82 KG/M2 | HEIGHT: 60 IN

## 2021-12-06 DIAGNOSIS — E11.9 TYPE 2 DIABETES MELLITUS WITHOUT COMPLICATION, WITH LONG-TERM CURRENT USE OF INSULIN (HCC): Primary | Chronic | ICD-10-CM

## 2021-12-06 DIAGNOSIS — E03.4 HYPOTHYROIDISM DUE TO ACQUIRED ATROPHY OF THYROID: Chronic | ICD-10-CM

## 2021-12-06 DIAGNOSIS — Z79.4 TYPE 2 DIABETES MELLITUS WITHOUT COMPLICATION, WITH LONG-TERM CURRENT USE OF INSULIN (HCC): Primary | Chronic | ICD-10-CM

## 2021-12-06 DIAGNOSIS — N18.32 STAGE 3B CHRONIC KIDNEY DISEASE (HCC): Chronic | ICD-10-CM

## 2021-12-06 PROCEDURE — 99214 OFFICE O/P EST MOD 30 MIN: CPT | Performed by: INTERNAL MEDICINE

## 2021-12-06 RX ORDER — OXCARBAZEPINE 150 MG/1
150 TABLET, FILM COATED ORAL 2 TIMES DAILY
Qty: 180 TABLET | Refills: 1 | Status: SHIPPED | OUTPATIENT
Start: 2021-12-06

## 2021-12-06 NOTE — PROGRESS NOTES
Subjective        Chief Complaint   Patient presents with   • Diabetes           Yasmin Cedeno is a 81 y.o. female who presents for    Patient Active Problem List   Diagnosis   • Type 2 diabetes mellitus without complication, with long-term current use of insulin (HCC)   • Chronic kidney disease, stage III (moderate)   • Vitamin D deficiency   • Mixed hyperlipidemia   • Hypothyroidism due to acquired atrophy of thyroid   • Chronic depression   • Idiopathic peripheral neuropathy   • Facial rhytids   • Class 1 obesity due to excess calories with serious comorbidity and body mass index (BMI) of 32.0 to 32.9 in adult       History of Present Illness     She is more sore in her back and legs today after working on [employer]. She has been checking her sugars and they are running high. Her sugars have been up to 170. She checks fasting when she gets up. She does not check after meals.  She has not been checking her BP. She has missed doses of her thyroid medication since she could not get from [place]. She thinks she was out for 10 days. She reports nl eye exam with Dr. Lucho Meehan.  Allergies   Allergen Reactions   • Cefaclor Hives   • Epinephrine Palpitations     Elevated heart rate at the dentist       Current Outpatient Medications on File Prior to Visit   Medication Sig Dispense Refill   • aspirin (aspirin) 81 MG EC tablet Take 81 mg by mouth Daily.     • atorvastatin (LIPITOR) 40 MG tablet Take 1 tablet by mouth Daily. 90 tablet 0   • B-D UF III MINI PEN NEEDLES 31G X 5 MM misc Use daily 50 each 3   • DULoxetine (CYMBALTA) 30 MG capsule Take 1 capsule by mouth Daily. 90 capsule 3   • DULoxetine (CYMBALTA) 60 MG capsule Take 1 capsule by mouth Daily. 90 capsule 3   • gabapentin (NEURONTIN) 300 MG capsule 2 TABS PO QHS AS NEEDED 180 capsule 1   • levothyroxine (SYNTHROID, LEVOTHROID) 100 MCG tablet Take 1 tablet by mouth Daily. Opposite days of 88mcg 90 tablet 0   • levothyroxine (SYNTHROID, LEVOTHROID) 88 MCG  tablet Take 1 tablet by mouth Daily. Take one tab qod 90 tablet 0   • lisinopril (PRINIVIL,ZESTRIL) 2.5 MG tablet TAKE ONE TABLET BY MOUTH DAILY 90 tablet 0   • metFORMIN (GLUMETZA) 1000 MG (MOD) 24 hr tablet Take 1 tablet by mouth 2 (Two) Times a Day. 180 tablet 1   • SAXagliptin (Onglyza) 5 MG tablet Take 1 tablet by mouth Daily. 90 tablet 0   • vitamin B-12 (CYANOCOBALAMIN) 1000 MCG tablet Take 1,000 mcg by mouth Daily.     • [DISCONTINUED] Insulin Glargine (Lantus SoloStar) 100 UNIT/ML injection pen Inject 26 Units under the skin into the appropriate area as directed Daily. 15 mL 2   • [DISCONTINUED] OXcarbazepine (TRILEPTAL) 150 MG tablet Take 1 tablet by mouth 2 (Two) Times a Day. 180 tablet 1     No current facility-administered medications on file prior to visit.       Past Medical History:   Diagnosis Date   • AC joint arthropathy    • B12 deficiency    • Chronic back pain    • History of eczema    • History of fall    • History of urinary tract infection    • Insomnia    • Loose body of shoulder    • Low back pain with radiation    • Nonspecific reaction to tuberculin skin test without active tuberculosis     0 active dz [place]; per eval 12/2002 Ct Chest 09/2002   • Obesity, Class I, BMI 30-34.9    • Osteoarthritis of shoulder    • Peripheral neuropathy     Back surgery   • Psoriasis    • Rhytides    • Rotator cuff tear    • Spondylolysis    • Tinnitus    • Tubular adenoma 01/2016   • Vitamin D deficiency    • Yeast infection        Past Surgical History:   Procedure Laterality Date   • BACK SURGERY      discectomy L5-S1. Hodes 01/06 & 03/06 repeated. Repeat Disc surgery 03/2003.   • CATARACT EXTRACTION      Jennie, Zoran S/p Cataract Rt 04/11   • CHOLECYSTECTOMY  12/1999    [facility]   • COLONOSCOPY  2016   • TONSILLECTOMY  1948       Family History   Problem Relation Age of Onset   • Hypertension Mother    • Other Mother         acute myocardial infarction    • Other Father         acute myocardial  "infarction    • Diabetes Maternal Grandfather    • Breast cancer Other    • Liver cancer Other    • Colon cancer Neg Hx    • Colon polyps Neg Hx        Social History     Socioeconomic History   • Marital status:    Tobacco Use   • Smoking status: Former Smoker     Quit date: 1968     Years since quittin.9   • Smokeless tobacco: Never Used   Substance and Sexual Activity   • Alcohol use: Never   • Drug use: Never           The following portions of the patient's history were reviewed and updated as appropriate: problem list, allergies, current medications, past medical history, past family history, past social history and past surgical history.    Review of Systems    Immunization History   Administered Date(s) Administered   • COVID-19 (PFIZER) 2021, 2021, 10/01/2021   • Fluzone High-Dose 65+yrs 2021   • Hepatitis A 2018, 2019   • Pneumococcal Conjugate 13-Valent (PCV13) 2018   • Pneumococcal Polysaccharide (PPSV23) 2019   • Shingrix 2021, 2021   • Tdap 08/10/2021       Objective   Vitals:    21 0923   BP: 104/80   Temp: 97.4 °F (36.3 °C)   Weight: 71.2 kg (157 lb)   Height: 152.4 cm (60\")     Body mass index is 30.66 kg/m².  Physical Exam  Vitals reviewed.   Constitutional:       Appearance: She is well-developed.   HENT:      Head: Normocephalic and atraumatic.   Cardiovascular:      Rate and Rhythm: Normal rate and regular rhythm.      Heart sounds: Normal heart sounds, S1 normal and S2 normal.   Pulmonary:      Effort: Pulmonary effort is normal.      Breath sounds: Normal breath sounds.   Skin:     General: Skin is warm.   Neurological:      Mental Status: She is alert.   Psychiatric:         Behavior: Behavior normal.         Procedures    Assessment/Plan   Diagnoses and all orders for this visit:    1. Type 2 diabetes mellitus without complication, with long-term current use of insulin (HCC) (Primary)  -     Insulin Glargine (LANTUS " SOLOSTAR) 100 UNIT/ML injection pen; Inject 30 Units under the skin into the appropriate area as directed Daily.  Dispense: 15 mL; Refill: 1  -     Hemoglobin A1c; Future    2. Hypothyroidism due to acquired atrophy of thyroid  -     TSH; Future    3. Stage 3b chronic kidney disease (HCC)  -     Comprehensive Metabolic Panel; Future    Other orders  -     OXcarbazepine (TRILEPTAL) 150 MG tablet; Take 1 tablet by mouth 2 (Two) Times a Day.  Dispense: 180 tablet; Refill: 1               Reviewed cmp, tsh and a1c. Her TSH is up b/c she did not have her meds for 10 days. Increase insulin to 30 units daily and she will start checking her sugars after her largest meals and let us know. I am hesitant to r/s glucotrol given low sugars in the past. I have been in contact with Dr. Gunderson today and he does not think that she needs any more colonoscopies.  Return in about 3 months (around 3/6/2022), or 30 minutes, for Lab Before FUP.

## 2022-01-25 DIAGNOSIS — Z79.4 TYPE 2 DIABETES MELLITUS WITHOUT COMPLICATION, WITH LONG-TERM CURRENT USE OF INSULIN: Chronic | ICD-10-CM

## 2022-01-25 DIAGNOSIS — E11.9 TYPE 2 DIABETES MELLITUS WITHOUT COMPLICATION, WITH LONG-TERM CURRENT USE OF INSULIN: Chronic | ICD-10-CM

## 2022-01-25 RX ORDER — INSULIN GLARGINE 100 [IU]/ML
INJECTION, SOLUTION SUBCUTANEOUS
Qty: 15 ML | Refills: 1 | Status: SHIPPED | OUTPATIENT
Start: 2022-01-25 | End: 2022-03-21

## 2022-03-16 ENCOUNTER — TELEPHONE (OUTPATIENT)
Dept: INTERNAL MEDICINE | Facility: CLINIC | Age: 82
End: 2022-03-16

## 2022-03-16 NOTE — TELEPHONE ENCOUNTER
Caller: Yasmin Cedeno    Relationship to patient: Self    Best call back number: [phone]      Type of visit: LAB APPOINTMENT    If rescheduling, when is the original appointment: 3/16

## 2022-03-21 ENCOUNTER — OFFICE VISIT (OUTPATIENT)
Dept: INTERNAL MEDICINE | Facility: CLINIC | Age: 82
End: 2022-03-21

## 2022-03-21 VITALS
HEIGHT: 60 IN | BODY MASS INDEX: 31.02 KG/M2 | DIASTOLIC BLOOD PRESSURE: 80 MMHG | SYSTOLIC BLOOD PRESSURE: 106 MMHG | WEIGHT: 158 LBS | TEMPERATURE: 97.3 F

## 2022-03-21 DIAGNOSIS — E03.4 HYPOTHYROIDISM DUE TO ACQUIRED ATROPHY OF THYROID: Chronic | ICD-10-CM

## 2022-03-21 DIAGNOSIS — E11.9 TYPE 2 DIABETES MELLITUS WITHOUT COMPLICATION, WITH LONG-TERM CURRENT USE OF INSULIN: Primary | Chronic | ICD-10-CM

## 2022-03-21 DIAGNOSIS — N18.30 STAGE 3 CHRONIC KIDNEY DISEASE, UNSPECIFIED WHETHER STAGE 3A OR 3B CKD: Chronic | ICD-10-CM

## 2022-03-21 DIAGNOSIS — Z79.4 TYPE 2 DIABETES MELLITUS WITHOUT COMPLICATION, WITH LONG-TERM CURRENT USE OF INSULIN: Primary | Chronic | ICD-10-CM

## 2022-03-21 PROCEDURE — 99214 OFFICE O/P EST MOD 30 MIN: CPT | Performed by: INTERNAL MEDICINE

## 2022-03-21 NOTE — PROGRESS NOTES
Subjective        Chief Complaint   Patient presents with   • Diabetes           Yasmin Cedeno is a 82 y.o. female who presents for    Patient Active Problem List   Diagnosis   • Type 2 diabetes mellitus without complication, with long-term current use of insulin ([facility])   • Chronic kidney disease, stage III (moderate)   • Vitamin D deficiency   • Mixed hyperlipidemia   • Hypothyroidism due to acquired atrophy of thyroid   • Chronic depression   • Idiopathic peripheral neuropathy   • Facial rhytids   • Class 1 obesity due to excess calories with serious comorbidity and body mass index (BMI) of 32.0 to 32.9 in adult       History of Present Illness     Her am sugar was 38 on Friday. She says she did not get my message over the weekend about decreasing her insulin. She was confused on Friday. She checks her sugars. Her range has been . She only gets confused if she does not eat. She denies chest pain or dyspnea. She has not been checking her BP.  Allergies   Allergen Reactions   • Cefaclor Hives   • Epinephrine Palpitations     Elevated heart rate at the dentist       Current Outpatient Medications on File Prior to Visit   Medication Sig Dispense Refill   • aspirin 81 MG EC tablet Take 81 mg by mouth Daily.     • atorvastatin (LIPITOR) 40 MG tablet Take 1 tablet by mouth Daily. 90 tablet 0   • DULoxetine (CYMBALTA) 30 MG capsule Take 1 capsule by mouth Daily. 90 capsule 3   • DULoxetine (CYMBALTA) 60 MG capsule Take 1 capsule by mouth Daily. 90 capsule 3   • gabapentin (NEURONTIN) 300 MG capsule 2 TABS PO QHS AS NEEDED 180 capsule 1   • levothyroxine (SYNTHROID, LEVOTHROID) 100 MCG tablet Take 1 tablet by mouth Daily. Opposite days of 88mcg 90 tablet 0   • levothyroxine (SYNTHROID, LEVOTHROID) 88 MCG tablet Take 1 tablet by mouth Daily. Take one tab qod 90 tablet 0   • lisinopril (PRINIVIL,ZESTRIL) 2.5 MG tablet TAKE ONE TABLET BY MOUTH DAILY 90 tablet 0   • metFORMIN (GLUMETZA) 1000 MG (MOD) 24 hr tablet Take 1  tablet by mouth 2 (Two) Times a Day. 180 tablet 1   • OXcarbazepine (TRILEPTAL) 150 MG tablet Take 1 tablet by mouth 2 (Two) Times a Day. 180 tablet 1   • SAXagliptin (Onglyza) 5 MG tablet Take 1 tablet by mouth Daily. 90 tablet 0   • vitamin B-12 (CYANOCOBALAMIN) 1000 MCG tablet Take 1,000 mcg by mouth Daily.     • [DISCONTINUED] Lantus SoloStar 100 UNIT/ML injection pen INJECT 26 UNITS UNDER THE SKIN INTO THE APPROPRIATE AREA AS DIRECTED DAILY (Patient taking differently: 40 Units.) 15 mL 1   • B-D UF III MINI PEN NEEDLES 31G X 5 MM misc Use daily 50 each 3     No current facility-administered medications on file prior to visit.       Past Medical History:   Diagnosis Date   • AC joint arthropathy    • B12 deficiency    • Chronic back pain    • History of eczema    • History of fall    • History of urinary tract infection    • Insomnia    • Loose body of shoulder    • Low back pain with radiation    • Nonspecific reaction to tuberculin skin test without active tuberculosis     0 active dz [place]; per [place] 12/2002 Ct Chest 09/2002   • Obesity, Class I, BMI 30-34.9    • Osteoarthritis of shoulder    • Peripheral neuropathy     Back surgery   • Psoriasis    • Rotator cuff tear    • Spondylolysis    • Tinnitus    • Tubular adenoma 01/2016   • Vitamin D deficiency        Past Surgical History:   Procedure Laterality Date   • BACK SURGERY      discectomy L5-S1. Hodes 01/06 & 03/06 repeated. Repeat Disc surgery 03/2003.   • CATARACT EXTRACTION      Jennie, Zoran S/p Cataract Rt 04/11   • CHOLECYSTECTOMY  12/1999    [facility]   • COLONOSCOPY  2016   • TONSILLECTOMY  1948       Family History   Problem Relation Age of Onset   • Hypertension Mother    • Other Mother         acute myocardial infarction    • Other Father         acute myocardial infarction    • Diabetes Maternal Grandfather    • Breast cancer Other    • Liver cancer Other    • Colon cancer Neg Hx    • Colon polyps Neg Hx        Social History  "    Socioeconomic History   • Marital status:    Tobacco Use   • Smoking status: Former Smoker     Quit date: 1968     Years since quittin.2   • Smokeless tobacco: Never Used   Substance and Sexual Activity   • Alcohol use: Never   • Drug use: Never           The following portions of the patient's history were reviewed and updated as appropriate: problem list, allergies, current medications, past medical history, past family history, past social history and past surgical history.    Review of Systems    Immunization History   Administered Date(s) Administered   • COVID-19 (PFIZER) PURPLE CAP 2021, 2021, 10/01/2021   • Fluzone High-Dose 65+yrs 2021   • Hepatitis A 2018, 2019   • Pneumococcal Conjugate 13-Valent (PCV13) 2018   • Pneumococcal Polysaccharide (PPSV23) 2019   • Shingrix 2021, 2021   • Tdap 08/10/2021       Objective   Vitals:    22 0745   BP: 106/80   Temp: 97.3 °F (36.3 °C)   Weight: 71.7 kg (158 lb)   Height: 152.4 cm (60\")     Body mass index is 30.86 kg/m².  Physical Exam  Vitals reviewed.   Constitutional:       Appearance: She is well-developed.   HENT:      Head: Normocephalic and atraumatic.   Cardiovascular:      Rate and Rhythm: Normal rate and regular rhythm.      Heart sounds: Normal heart sounds, S1 normal and S2 normal.   Pulmonary:      Effort: Pulmonary effort is normal.      Breath sounds: Normal breath sounds.   Skin:     General: Skin is warm.   Neurological:      Mental Status: She is alert.   Psychiatric:         Behavior: Behavior normal.         Procedures    Assessment/Plan   Diagnoses and all orders for this visit:    1. Type 2 diabetes mellitus without complication, with long-term current use of insulin (HCC) (Primary)  -     Insulin Glargine (LANTUS SOLOSTAR) 100 UNIT/ML injection pen; Inject 25 Units under the skin into the appropriate area as directed Daily.  Dispense: 15 mL; Refill: 2  -     Hemoglobin " A1c; Future  -     Lipid Panel With / Chol / HDL Ratio; Future  -     Microalbumin / Creatinine Urine Ratio - Urine, Clean Catch; Future    2. Hypothyroidism due to acquired atrophy of thyroid    3. Stage 3 chronic kidney disease, unspecified whether stage 3a or 3b CKD (HCC)  -     Comprehensive Metabolic Panel; Future  -     CBC & Differential; Future               Reviewed tsh, cmp and a1c. Decrease insulin to 25 units. When she gets labs in the future, she will take half the dose of her insulin the day before. Reports [facility].  Return in about 4 months (around 7/21/2022) for Lab Before FUP.

## 2022-03-22 RX ORDER — LISINOPRIL 2.5 MG/1
TABLET ORAL
Qty: 90 TABLET | Refills: 3 | Status: SHIPPED | OUTPATIENT
Start: 2022-03-22

## 2022-05-04 RX ORDER — ATORVASTATIN CALCIUM 40 MG/1
TABLET, FILM COATED ORAL
Qty: 90 TABLET | Refills: 0 | Status: SHIPPED | OUTPATIENT
Start: 2022-05-04 | End: 2022-10-24

## 2022-06-09 DIAGNOSIS — E11.9 TYPE 2 DIABETES MELLITUS WITHOUT COMPLICATION, WITH LONG-TERM CURRENT USE OF INSULIN: Chronic | ICD-10-CM

## 2022-06-09 DIAGNOSIS — Z79.4 TYPE 2 DIABETES MELLITUS WITHOUT COMPLICATION, WITH LONG-TERM CURRENT USE OF INSULIN: Chronic | ICD-10-CM

## 2022-06-09 RX ORDER — SAXAGLIPTIN 5 MG/1
TABLET, FILM COATED ORAL
Qty: 90 TABLET | Refills: 0 | Status: SHIPPED | OUTPATIENT
Start: 2022-06-09 | End: 2022-11-09 | Stop reason: SDUPTHER

## 2022-07-22 ENCOUNTER — OFFICE VISIT (OUTPATIENT)
Dept: INTERNAL MEDICINE | Facility: CLINIC | Age: 82
End: 2022-07-22

## 2022-07-22 VITALS
TEMPERATURE: 97.8 F | WEIGHT: 145 LBS | SYSTOLIC BLOOD PRESSURE: 106 MMHG | BODY MASS INDEX: 28.47 KG/M2 | HEIGHT: 60 IN | DIASTOLIC BLOOD PRESSURE: 80 MMHG

## 2022-07-22 DIAGNOSIS — Z79.4 TYPE 2 DIABETES MELLITUS WITHOUT COMPLICATION, WITH LONG-TERM CURRENT USE OF INSULIN: Chronic | ICD-10-CM

## 2022-07-22 DIAGNOSIS — E11.9 TYPE 2 DIABETES MELLITUS WITHOUT COMPLICATION, WITH LONG-TERM CURRENT USE OF INSULIN: Chronic | ICD-10-CM

## 2022-07-22 DIAGNOSIS — E03.4 HYPOTHYROIDISM DUE TO ACQUIRED ATROPHY OF THYROID: Chronic | ICD-10-CM

## 2022-07-22 DIAGNOSIS — F51.01 PRIMARY INSOMNIA: ICD-10-CM

## 2022-07-22 DIAGNOSIS — E78.2 MIXED HYPERLIPIDEMIA: Chronic | ICD-10-CM

## 2022-07-22 DIAGNOSIS — N18.32 STAGE 3B CHRONIC KIDNEY DISEASE: Primary | Chronic | ICD-10-CM

## 2022-07-22 DIAGNOSIS — Z79.4 TYPE 2 DIABETES MELLITUS WITHOUT COMPLICATION, WITH LONG-TERM CURRENT USE OF INSULIN: Primary | ICD-10-CM

## 2022-07-22 DIAGNOSIS — E11.9 TYPE 2 DIABETES MELLITUS WITHOUT COMPLICATION, WITH LONG-TERM CURRENT USE OF INSULIN: Primary | ICD-10-CM

## 2022-07-22 DIAGNOSIS — M79.604 RIGHT LEG PAIN: ICD-10-CM

## 2022-07-22 PROCEDURE — 99214 OFFICE O/P EST MOD 30 MIN: CPT | Performed by: INTERNAL MEDICINE

## 2022-07-22 NOTE — PROGRESS NOTES
Subjective        Chief Complaint   Patient presents with   • Diabetes           Yasmin Cedeno is a 82 y.o. female who presents for    Patient Active Problem List   Diagnosis   • Type 2 diabetes mellitus without complication, with long-term current use of insulin (HCC)   • Chronic kidney disease, stage III (moderate)   • Vitamin D deficiency   • Mixed hyperlipidemia   • Hypothyroidism due to acquired atrophy of thyroid   • Chronic depression   • Idiopathic peripheral neuropathy   • Facial rhytids   • Class 1 obesity due to excess calories with serious comorbidity and body mass index (BMI) of 32.0 to 32.9 in adult   • Primary insomnia       History of Present Illness     Her sugars run 90s to lower 100s. She denies chest pain or dyspnea. It can take hours to fall asleep. This has been the case for years. She does go to the bed at 11 pm nightly.  She now has pain in her right leg for the last month. Her back pain is unchanged. She denies incontinence. She denies leg weakness.  Allergies   Allergen Reactions   • Cefaclor Hives   • Epinephrine Palpitations     Elevated heart rate at the dentist       Current Outpatient Medications on File Prior to Visit   Medication Sig Dispense Refill   • aspirin 81 MG EC tablet Take 81 mg by mouth Daily.     • atorvastatin (LIPITOR) 40 MG tablet TAKE ONE TABLET BY MOUTH DAILY 90 tablet 0   • B-D UF III MINI PEN NEEDLES 31G X 5 MM misc Use daily 50 each 3   • DULoxetine (CYMBALTA) 30 MG capsule Take 1 capsule by mouth Daily. 90 capsule 3   • DULoxetine (CYMBALTA) 60 MG capsule Take 1 capsule by mouth Daily. 90 capsule 3   • gabapentin (NEURONTIN) 300 MG capsule 2 TABS PO QHS AS NEEDED 180 capsule 1   • Insulin Glargine (LANTUS SOLOSTAR) 100 UNIT/ML injection pen Inject 25 Units under the skin into the appropriate area as directed Daily. 15 mL 2   • levothyroxine (SYNTHROID, LEVOTHROID) 100 MCG tablet Take 1 tablet by mouth Daily. Opposite days of 88mcg 90 tablet 0   • levothyroxine  (SYNTHROID, LEVOTHROID) 88 MCG tablet Take 1 tablet by mouth Daily. Take one tab qod 90 tablet 0   • lisinopril (PRINIVIL,ZESTRIL) 2.5 MG tablet TAKE ONE TABLET BY MOUTH DAILY 90 tablet 3   • metFORMIN (GLUMETZA) 1000 MG (MOD) 24 hr tablet Take 1 tablet by mouth 2 (Two) Times a Day. 180 tablet 1   • Onglyza 5 MG tablet TAKE ONE TABLET BY MOUTH DAILY 90 tablet 0   • OXcarbazepine (TRILEPTAL) 150 MG tablet Take 1 tablet by mouth 2 (Two) Times a Day. 180 tablet 1   • vitamin B-12 (CYANOCOBALAMIN) 1000 MCG tablet Take 1,000 mcg by mouth Daily.       No current facility-administered medications on file prior to visit.       Past Medical History:   Diagnosis Date   • AC joint arthropathy    • B12 deficiency    • Chronic back pain    • History of eczema    • History of fall    • History of urinary tract infection    • Insomnia    • Loose body of shoulder    • Low back pain with radiation    • Nonspecific reaction to tuberculin skin test without active tuberculosis     0 active dz [place]; per eval 12/2002 Ct Chest 09/2002   • Obesity, Class I, BMI 30-34.9    • Osteoarthritis of shoulder    • Peripheral neuropathy     Back surgery   • Psoriasis    • Rotator cuff tear    • Spondylolysis    • Tinnitus    • Tubular adenoma 01/2016   • Vitamin D deficiency        Past Surgical History:   Procedure Laterality Date   • BACK SURGERY      discectomy L5-S1. Hodes 01/06 & 03/06 repeated. Repeat Disc surgery 03/2003.   • CATARACT EXTRACTION      Left, Zoran S/p Cataract Rt 04/11   • CHOLECYSTECTOMY  12/1999    [facility]   • COLONOSCOPY  2016   • TONSILLECTOMY  1948       Family History   Problem Relation Age of Onset   • Hypertension Mother    • Other Mother         acute myocardial infarction    • Other Father         acute myocardial infarction    • Diabetes Maternal Grandfather    • Breast cancer Other    • Liver cancer Other    • Colon cancer Neg Hx    • Colon polyps Neg Hx        Social History     Socioeconomic History   •  "Marital status:    Tobacco Use   • Smoking status: Former Smoker     Quit date: 1968     Years since quittin.5   • Smokeless tobacco: Never Used   Substance and Sexual Activity   • Alcohol use: Never   • Drug use: Never           The following portions of the patient's history were reviewed and updated as appropriate: problem list, allergies, current medications, past medical history, past family history, past social history and past surgical history.    Review of Systems    Immunization History   Administered Date(s) Administered   • COVID-19 (PFIZER) PURPLE CAP 2021, 2021, 10/01/2021   • Covid-19 (Pfizer) Gray Cap 2022   • Fluzone High-Dose 65+yrs 2021   • Hepatitis A 2018, 2019   • Pneumococcal Conjugate 13-Valent (PCV13) 2018   • Pneumococcal Polysaccharide (PPSV23) 2019   • Shingrix 2021, 2021   • Tdap 08/10/2021       Objective   Vitals:    22 0909   BP: 106/80   Temp: 97.8 °F (36.6 °C)   Weight: 65.8 kg (145 lb)   Height: 152.4 cm (60\")     Body mass index is 28.32 kg/m².  Physical Exam  Vitals reviewed.   Constitutional:       Appearance: She is well-developed.   HENT:      Head: Normocephalic and atraumatic.   Cardiovascular:      Rate and Rhythm: Normal rate and regular rhythm.      Heart sounds: Normal heart sounds, S1 normal and S2 normal.   Pulmonary:      Effort: Pulmonary effort is normal.      Breath sounds: Normal breath sounds.   Skin:     General: Skin is warm.   Neurological:      Mental Status: She is alert.      Deep Tendon Reflexes:      Reflex Scores:       Patellar reflexes are 2+ on the right side and 2+ on the left side.       Achilles reflexes are 1+ on the right side and 0 on the left side.     Comments: 5/5 strength in her legs   Psychiatric:         Behavior: Behavior normal.         Procedures    Assessment & Plan   Diagnoses and all orders for this visit:    1. Stage 3b chronic kidney disease (HCC) " (Primary)  -     Comprehensive Metabolic Panel; Future    2. Type 2 diabetes mellitus without complication, with long-term current use of insulin (HCC)  -     Hemoglobin A1c; Future  -     Microalbumin / Creatinine Urine Ratio - Urine, Clean Catch; Future    3. Mixed hyperlipidemia    4. Hypothyroidism due to acquired atrophy of thyroid  -     TSH; Future    5. Primary insomnia  Comments:  Melatonin 1 mg hs    6. Right leg pain  Comments:  Declines PT             Reviewed cbc, cmp, a1c and flp. LDL and a1c are at goal.    Return in about 4 months (around 11/22/2022) for Medicare Wellness, Lab Before FUP.

## 2022-07-23 LAB
ALBUMIN/CREAT UR: <7 MG/G CREAT (ref 0–29)
CREAT UR-MCNC: 45.4 MG/DL
MICROALBUMIN UR-MCNC: <3 UG/ML

## 2022-07-27 ENCOUNTER — TELEPHONE (OUTPATIENT)
Dept: INTERNAL MEDICINE | Facility: CLINIC | Age: 82
End: 2022-07-27

## 2022-08-01 DIAGNOSIS — G60.9 IDIOPATHIC PERIPHERAL NEUROPATHY: Chronic | ICD-10-CM

## 2022-08-01 RX ORDER — GABAPENTIN 300 MG/1
CAPSULE ORAL
Qty: 180 CAPSULE | Refills: 1 | Status: SHIPPED | OUTPATIENT
Start: 2022-08-01

## 2022-08-15 DIAGNOSIS — E11.9 TYPE 2 DIABETES MELLITUS WITHOUT COMPLICATION, WITH LONG-TERM CURRENT USE OF INSULIN: ICD-10-CM

## 2022-08-15 DIAGNOSIS — Z79.4 TYPE 2 DIABETES MELLITUS WITHOUT COMPLICATION, WITH LONG-TERM CURRENT USE OF INSULIN: ICD-10-CM

## 2022-08-15 RX ORDER — LEVOTHYROXINE SODIUM 0.1 MG/1
TABLET ORAL
Qty: 45 TABLET | Refills: 3 | Status: SHIPPED | OUTPATIENT
Start: 2022-08-15

## 2022-08-15 RX ORDER — METFORMIN HYDROCHLORIDE 1000 MG/1
TABLET, FILM COATED, EXTENDED RELEASE ORAL
Qty: 60 TABLET | Refills: 4 | Status: SHIPPED | OUTPATIENT
Start: 2022-08-15

## 2022-08-23 RX ORDER — FLURBIPROFEN SODIUM 0.3 MG/ML
SOLUTION/ DROPS OPHTHALMIC
Qty: 100 EACH | Refills: 0 | Status: SHIPPED | OUTPATIENT
Start: 2022-08-23

## 2022-10-24 RX ORDER — ATORVASTATIN CALCIUM 40 MG/1
TABLET, FILM COATED ORAL
Qty: 90 TABLET | Refills: 0 | Status: SHIPPED | OUTPATIENT
Start: 2022-10-24 | End: 2023-03-09

## 2022-10-25 ENCOUNTER — TELEPHONE (OUTPATIENT)
Dept: INTERNAL MEDICINE | Facility: CLINIC | Age: 82
End: 2022-10-25

## 2022-10-25 NOTE — TELEPHONE ENCOUNTER
[facility], needs clarification on Rx/ Levothyroxin 88 MCG, please advise [phone]

## 2022-11-09 DIAGNOSIS — E11.9 TYPE 2 DIABETES MELLITUS WITHOUT COMPLICATION, WITH LONG-TERM CURRENT USE OF INSULIN: Chronic | ICD-10-CM

## 2022-11-09 DIAGNOSIS — F32.A CHRONIC DEPRESSION: Chronic | ICD-10-CM

## 2022-11-09 DIAGNOSIS — Z79.4 TYPE 2 DIABETES MELLITUS WITHOUT COMPLICATION, WITH LONG-TERM CURRENT USE OF INSULIN: Chronic | ICD-10-CM

## 2022-11-09 RX ORDER — SAXAGLIPTIN 5 MG/1
TABLET, FILM COATED ORAL
Qty: 90 TABLET | Refills: 1 | Status: SHIPPED | OUTPATIENT
Start: 2022-11-09 | End: 2023-02-07

## 2022-11-09 RX ORDER — DULOXETIN HYDROCHLORIDE 60 MG/1
CAPSULE, DELAYED RELEASE ORAL
Qty: 90 CAPSULE | Refills: 3 | Status: SHIPPED | OUTPATIENT
Start: 2022-11-09

## 2022-11-29 ENCOUNTER — OFFICE VISIT (OUTPATIENT)
Dept: INTERNAL MEDICINE | Facility: CLINIC | Age: 82
End: 2022-11-29

## 2022-11-29 VITALS
BODY MASS INDEX: 28.27 KG/M2 | DIASTOLIC BLOOD PRESSURE: 80 MMHG | SYSTOLIC BLOOD PRESSURE: 106 MMHG | HEIGHT: 60 IN | WEIGHT: 144 LBS

## 2022-11-29 DIAGNOSIS — Z79.4 TYPE 2 DIABETES MELLITUS WITHOUT COMPLICATION, WITH LONG-TERM CURRENT USE OF INSULIN: ICD-10-CM

## 2022-11-29 DIAGNOSIS — E03.4 HYPOTHYROIDISM DUE TO ACQUIRED ATROPHY OF THYROID: ICD-10-CM

## 2022-11-29 DIAGNOSIS — Z00.00 ENCOUNTER FOR SUBSEQUENT ANNUAL WELLNESS VISIT (AWV) IN MEDICARE PATIENT: Primary | ICD-10-CM

## 2022-11-29 DIAGNOSIS — E78.2 MIXED HYPERLIPIDEMIA: ICD-10-CM

## 2022-11-29 DIAGNOSIS — E11.9 TYPE 2 DIABETES MELLITUS WITHOUT COMPLICATION, WITH LONG-TERM CURRENT USE OF INSULIN: ICD-10-CM

## 2022-11-29 PROCEDURE — 1170F FXNL STATUS ASSESSED: CPT | Performed by: INTERNAL MEDICINE

## 2022-11-29 PROCEDURE — 1159F MED LIST DOCD IN RCRD: CPT | Performed by: INTERNAL MEDICINE

## 2022-11-29 PROCEDURE — G0439 PPPS, SUBSEQ VISIT: HCPCS | Performed by: INTERNAL MEDICINE

## 2022-11-29 PROCEDURE — 1160F RVW MEDS BY RX/DR IN RCRD: CPT | Performed by: INTERNAL MEDICINE

## 2022-11-29 PROCEDURE — 96160 PT-FOCUSED HLTH RISK ASSMT: CPT | Performed by: INTERNAL MEDICINE

## 2022-11-29 NOTE — PROGRESS NOTES
The ABCs of the Annual Wellness Visit  Subsequent Medicare Wellness Visit    Chief Complaint   Patient presents with   • Medicare Wellness-subsequent      Subjective    History of Present Illness:  Yasmin Cedeno is a 82 y.o. female who presents for a Subsequent Medicare Wellness Visit.  Her sugars run . She denies chest pain or dyspnea. She fell down the stairs while carrying a box. She bruised her left knee. She thinks her depression is good. She reports no diabetic changes to her eyes.  The following portions of the patient's history were reviewed and   updated as appropriate: allergies, current medications, past family history, past medical history, past social history, past surgical history and problem list.    Compared to one year ago, the patient feels her physical   health is the same.    Compared to one year ago, the patient feels her mental   health is the same.    Recent Hospitalizations:  She was not admitted to the hospital during the last year.       Current Medical Providers:  Patient Care Team:  Dash Sanchez MD as PCP - General (Internal Medicine)    Outpatient Medications Prior to Visit   Medication Sig Dispense Refill   • atorvastatin (LIPITOR) 40 MG tablet TAKE ONE TABLET BY MOUTH DAILY 90 tablet 0   • B-D UF III MINI PEN NEEDLES 31G X 5 MM misc USE DAILY 100 each 0   • DULoxetine (CYMBALTA) 30 MG capsule Take 1 capsule by mouth Daily. 90 capsule 3   • DULoxetine (CYMBALTA) 60 MG capsule TAKE ONE CAPSULE BY MOUTH DAILY 90 capsule 3   • gabapentin (NEURONTIN) 300 MG capsule TAKE TWO CAPSULES BY MOUTH EVERY NIGHT AT BEDTIME AS NEEDED 180 capsule 1   • Insulin Glargine (LANTUS SOLOSTAR) 100 UNIT/ML injection pen Inject 25 Units under the skin into the appropriate area as directed Daily. 15 mL 2   • levothyroxine (SYNTHROID, LEVOTHROID) 100 MCG tablet TAKE ONE TABLET BY MOUTH DAILY ON OPPOSITE DAYS OF 88 MCG TABLET 45 tablet 3   • levothyroxine (SYNTHROID, LEVOTHROID) 88 MCG tablet Take 1  "tablet by mouth Daily. Take one tab qod 90 tablet 0   • lisinopril (PRINIVIL,ZESTRIL) 2.5 MG tablet TAKE ONE TABLET BY MOUTH DAILY 90 tablet 3   • metFORMIN (GLUMETZA) 1000 MG (MOD) 24 hr tablet TAKE ONE TABLET BY MOUTH TWICE A DAY 60 tablet 4   • Onglyza 5 MG tablet TAKE ONE TABLET BY MOUTH DAILY 90 tablet 1   • OXcarbazepine (TRILEPTAL) 150 MG tablet Take 1 tablet by mouth 2 (Two) Times a Day. 180 tablet 1   • vitamin B-12 (CYANOCOBALAMIN) 1000 MCG tablet Take 1,000 mcg by mouth Daily.     • aspirin 81 MG EC tablet Take 81 mg by mouth Daily.       No facility-administered medications prior to visit.       No opioid medication identified on active medication list. I have reviewed chart for other potential  high risk medication/s and harmful drug interactions in the elderly.          Aspirin is on active medication list. Aspirin use is not indicated based on review of current medical condition/s. Risk of harm outweighs potential benefits. Patient instructed to discontinue this medication.  .      Patient Active Problem List   Diagnosis   • Type 2 diabetes mellitus without complication, with long-term current use of insulin (HCC)   • Chronic kidney disease, stage III (moderate)   • Vitamin D deficiency   • Mixed hyperlipidemia   • Hypothyroidism due to acquired atrophy of thyroid   • Chronic depression   • Idiopathic peripheral neuropathy   • Facial rhytids   • Class 1 obesity due to excess calories with serious comorbidity and body mass index (BMI) of 32.0 to 32.9 in adult   • Primary insomnia     Advance Care Planning  Advance Directive is not on file.  ACP discussion was held with the patient during this visit. Patient does not have an advance directive, information provided.          Objective    Vitals:    11/29/22 1332   BP: 106/80   Weight: 65.3 kg (144 lb)   Height: 152.4 cm (60\")     Estimated body mass index is 28.12 kg/m² as calculated from the following:    Height as of this encounter: 152.4 cm (60\").    " Weight as of this encounter: 65.3 kg (144 lb).    BMI is >= 25 and <30. (Overweight) The following options were offered after discussion;: exercise counseling/recommendations      Does the patient have evidence of cognitive impairment? No    Physical Exam  Vitals reviewed.   Constitutional:       Appearance: She is well-developed.   HENT:      Head: Normocephalic and atraumatic.   Neck:      Vascular: No carotid bruit.   Cardiovascular:      Rate and Rhythm: Normal rate and regular rhythm.      Heart sounds: Normal heart sounds, S1 normal and S2 normal.   Pulmonary:      Effort: Pulmonary effort is normal.      Breath sounds: Normal breath sounds.   Skin:     General: Skin is warm.   Neurological:      Mental Status: She is alert.   Psychiatric:         Behavior: Behavior normal.       Lab Results   Component Value Date    HGBA1C 6.70 (H) 2022            HEALTH RISK ASSESSMENT    Smoking Status:  Social History     Tobacco Use   Smoking Status Former   • Types: Cigarettes   • Quit date:    • Years since quittin.9   Smokeless Tobacco Never     Alcohol Consumption:  Social History     Substance and Sexual Activity   Alcohol Use Never     Fall Risk Screen:    STEADI Fall Risk Assessment was completed, and patient is at MODERATE risk for falls. Assessment completed on:2022    Depression Screening:  PHQ-2/PHQ-9 Depression Screening 2022   Retired PHQ-9 Total Score -   Retired Total Score -   Little Interest or Pleasure in Doing Things 0-->not at all   Feeling Down, Depressed or Hopeless 0-->not at all   PHQ-9: Brief Depression Severity Measure Score 0       Health Habits and Functional and Cognitive Screening:  Functional & Cognitive Status 2022   Do you have difficulty preparing food and eating? No   Do you have difficulty bathing yourself, getting dressed or grooming yourself? No   Do you have difficulty using the toilet? No   Do you have difficulty moving around from place to place? No    Do you have trouble with steps or getting out of a bed or a chair? No   Current Diet Limited Junk Food   Dental Exam Up to date   Eye Exam Up to date   Exercise (times per week) 0 times per week   Current Exercises Include No Regular Exercise   Do you need help using the phone?  No   Are you deaf or do you have serious difficulty hearing?  No   Do you need help with transportation? No   Do you need help shopping? No   Do you need help preparing meals?  No   Do you need help with housework?  No   Do you need help with laundry? No   Do you need help taking your medications? No   Do you need help managing money? No   Do you ever drive or ride in a car without wearing a seat belt? No   Have you felt unusual stress, anger or loneliness in the last month? No   Who do you live with? Child   If you need help, do you have trouble finding someone available to you? No   Have you been bothered in the last four weeks by sexual problems? No   Do you have difficulty concentrating, remembering or making decisions? No       Age-appropriate Screening Schedule:  Refer to the list below for future screening recommendations based on patient's age, sex and/or medical conditions. Orders for these recommended tests are listed in the plan section. The patient has been provided with a written plan.    Health Maintenance   Topic Date Due   • HEMOGLOBIN A1C  05/18/2023   • LIPID PANEL  07/15/2023   • DXA SCAN  08/06/2023   • DIABETIC EYE EXAM  11/15/2023   • URINE MICROALBUMIN  11/18/2023   • TDAP/TD VACCINES (2 - Td or Tdap) 08/10/2031   • INFLUENZA VACCINE  Completed   • ZOSTER VACCINE  Completed              Assessment & Plan   CMS Preventative Services Quick Reference  Risk Factors Identified During Encounter  None Identified  The above risks/problems have been discussed with the patient.  Follow up actions/plans if indicated are seen below in the Assessment/Plan Section.  Pertinent information has been shared with the patient in the  After Visit Summary.    Diagnoses and all orders for this visit:    1. Encounter for subsequent annual wellness visit (AWV) in Medicare patient (Primary)    2. Type 2 diabetes mellitus without complication, with long-term current use of insulin (HCC)  -     Hemoglobin A1c; Future    3. Mixed hyperlipidemia  -     Comprehensive Metabolic Panel; Future  -     Lipid Panel With / Chol / HDL Ratio; Future    4. Hypothyroidism due to acquired atrophy of thyroid  -     TSH; Future        Follow Up:   Return in about 6 months (around 5/29/2023), or 30 minutes, for Lab Before FUP.     An After Visit Summary and PPPS were made available to the patient.                 Reviewed labs. Okay to stop asa. A1c is at goal as is her TSH.

## 2022-12-11 DIAGNOSIS — F32.A CHRONIC DEPRESSION: Chronic | ICD-10-CM

## 2022-12-12 RX ORDER — DULOXETIN HYDROCHLORIDE 30 MG/1
CAPSULE, DELAYED RELEASE ORAL
Qty: 90 CAPSULE | Refills: 3 | Status: SHIPPED | OUTPATIENT
Start: 2022-12-12

## 2023-01-12 DIAGNOSIS — Z79.4 TYPE 2 DIABETES MELLITUS WITHOUT COMPLICATION, WITH LONG-TERM CURRENT USE OF INSULIN: Chronic | ICD-10-CM

## 2023-01-12 DIAGNOSIS — E11.9 TYPE 2 DIABETES MELLITUS WITHOUT COMPLICATION, WITH LONG-TERM CURRENT USE OF INSULIN: Chronic | ICD-10-CM

## 2023-01-12 RX ORDER — INSULIN GLARGINE 100 [IU]/ML
INJECTION, SOLUTION SUBCUTANEOUS
Qty: 15 ML | Refills: 2 | Status: SHIPPED | OUTPATIENT
Start: 2023-01-12

## 2023-01-30 ENCOUNTER — EXTERNAL PBMM DATA (OUTPATIENT)
Dept: PHARMACY | Facility: OTHER | Age: 83
End: 2023-01-30
Payer: MEDICARE

## 2023-02-27 ENCOUNTER — EXTERNAL PBMM DATA (OUTPATIENT)
Dept: PHARMACY | Facility: OTHER | Age: 83
End: 2023-02-27
Payer: MEDICARE

## 2023-03-09 RX ORDER — ATORVASTATIN CALCIUM 40 MG/1
TABLET, FILM COATED ORAL
Qty: 90 TABLET | Refills: 0 | Status: SHIPPED | OUTPATIENT
Start: 2023-03-09

## 2023-03-15 RX ORDER — LEVOTHYROXINE SODIUM 88 UG/1
TABLET ORAL
Qty: 45 TABLET | Refills: 1 | Status: SHIPPED | OUTPATIENT
Start: 2023-03-15

## 2023-03-15 NOTE — TELEPHONE ENCOUNTER
[facility] called stating [facility] [phone] only gave her half of the prescription, they told her that was all that she had left and that she should not be taking Levothyroxine 100 mg on day then 88 mg another day, patient told them that was what she was advised to do, please advise?  [phone]

## 2023-04-13 DIAGNOSIS — G60.9 IDIOPATHIC PERIPHERAL NEUROPATHY: Chronic | ICD-10-CM

## 2023-04-14 RX ORDER — GABAPENTIN 300 MG/1
CAPSULE ORAL
Qty: 180 CAPSULE | Refills: 1 | Status: SHIPPED | OUTPATIENT
Start: 2023-04-14

## 2023-04-14 RX ORDER — OXCARBAZEPINE 150 MG/1
TABLET, FILM COATED ORAL
Qty: 180 TABLET | Refills: 1 | Status: SHIPPED | OUTPATIENT
Start: 2023-04-14

## 2023-05-11 DIAGNOSIS — Z79.4 TYPE 2 DIABETES MELLITUS WITHOUT COMPLICATION, WITH LONG-TERM CURRENT USE OF INSULIN: ICD-10-CM

## 2023-05-11 DIAGNOSIS — E11.9 TYPE 2 DIABETES MELLITUS WITHOUT COMPLICATION, WITH LONG-TERM CURRENT USE OF INSULIN: ICD-10-CM

## 2023-05-11 RX ORDER — METFORMIN HYDROCHLORIDE 1000 MG/1
TABLET, FILM COATED, EXTENDED RELEASE ORAL
Qty: 60 TABLET | Refills: 4 | Status: SHIPPED | OUTPATIENT
Start: 2023-05-11

## 2023-05-30 ENCOUNTER — OFFICE VISIT (OUTPATIENT)
Dept: INTERNAL MEDICINE | Facility: CLINIC | Age: 83
End: 2023-05-30

## 2023-05-30 VITALS
DIASTOLIC BLOOD PRESSURE: 80 MMHG | WEIGHT: 142 LBS | HEIGHT: 60 IN | TEMPERATURE: 96.9 F | SYSTOLIC BLOOD PRESSURE: 102 MMHG | BODY MASS INDEX: 27.88 KG/M2

## 2023-05-30 DIAGNOSIS — E11.9 TYPE 2 DIABETES MELLITUS WITHOUT COMPLICATION, WITH LONG-TERM CURRENT USE OF INSULIN: Chronic | ICD-10-CM

## 2023-05-30 DIAGNOSIS — E78.2 MIXED HYPERLIPIDEMIA: Chronic | ICD-10-CM

## 2023-05-30 DIAGNOSIS — E03.4 HYPOTHYROIDISM DUE TO ACQUIRED ATROPHY OF THYROID: Primary | Chronic | ICD-10-CM

## 2023-05-30 DIAGNOSIS — Z79.4 TYPE 2 DIABETES MELLITUS WITHOUT COMPLICATION, WITH LONG-TERM CURRENT USE OF INSULIN: Chronic | ICD-10-CM

## 2023-05-30 NOTE — PROGRESS NOTES
Subjective        Chief Complaint   Patient presents with   • Hyperlipidemia           Yasmin Cedeno is a 83 y.o. female who presents for    Patient Active Problem List   Diagnosis   • Type 2 diabetes mellitus without complication, with long-term current use of insulin   • Chronic kidney disease, stage III (moderate)   • Vitamin D deficiency   • Mixed hyperlipidemia   • Hypothyroidism due to acquired atrophy of thyroid   • Chronic depression   • Idiopathic peripheral neuropathy   • Facial rhytids   • Class 1 obesity due to excess calories with serious comorbidity and body mass index (BMI) of 32.0 to 32.9 in adult   • Primary insomnia       History of Present Illness     She last checked her sugars on Saturday. Her sugars run . She eats one full meal per day. She does not eat breakfast. Her metformin is a bigger tablet and it is harder to swallow. She denies chest pain or dyspnea. She is stable emotionally. She had some diarrhea start since last night and she took imodium.   Allergies   Allergen Reactions   • Cefaclor Hives   • Epinephrine Palpitations     Elevated heart rate at the dentist       Current Outpatient Medications on File Prior to Visit   Medication Sig Dispense Refill   • atorvastatin (LIPITOR) 40 MG tablet TAKE ONE TABLET BY MOUTH DAILY 90 tablet 0   • B-D UF III MINI PEN NEEDLES 31G X 5 MM misc USE DAILY 100 each 0   • DULoxetine (CYMBALTA) 30 MG capsule TAKE ONE CAPSULE BY MOUTH DAILY 90 capsule 3   • DULoxetine (CYMBALTA) 60 MG capsule TAKE ONE CAPSULE BY MOUTH DAILY 90 capsule 3   • gabapentin (NEURONTIN) 300 MG capsule TAKE TWO CAPSULES BY MOUTH EVERY NIGHT AT BEDTIME AS NEEDED 180 capsule 1   • levothyroxine (SYNTHROID, LEVOTHROID) 100 MCG tablet TAKE ONE TABLET BY MOUTH DAILY ON OPPOSITE DAYS OF 88 MCG TABLET 45 tablet 3   • lisinopril (PRINIVIL,ZESTRIL) 2.5 MG tablet TAKE ONE TABLET BY MOUTH DAILY 90 tablet 3   • Onglyza 5 MG tablet TAKE ONE TABLET BY MOUTH DAILY 90 tablet 1   •  OXcarbazepine (TRILEPTAL) 150 MG tablet TAKE ONE TABLET BY MOUTH TWICE A  tablet 1   • vitamin B-12 (CYANOCOBALAMIN) 1000 MCG tablet Take 1 tablet by mouth Daily.     • [DISCONTINUED] Lantus SoloStar 100 UNIT/ML injection pen INJECT 25 UNITS UNDER THE SKIN INTO THE APPROPRIATE AREA AS DIRECTED DAILY 15 mL 2   • [DISCONTINUED] levothyroxine (SYNTHROID, LEVOTHROID) 88 MCG tablet TAKE ONE TABLET BY MOUTH EVERY OTHER DAY 45 tablet 1   • [DISCONTINUED] metFORMIN (GLUMETZA) 1000 MG (MOD) 24 hr tablet TAKE ONE TABLET BY MOUTH TWICE A DAY 60 tablet 4     No current facility-administered medications on file prior to visit.       Past Medical History:   Diagnosis Date   • AC joint arthropathy    • B12 deficiency    • Chronic back pain    • History of eczema    • History of fall    • History of urinary tract infection    • Insomnia    • Low back pain with radiation    • Nonspecific reaction to tuberculin skin test without active tuberculosis     0 active dz [place]; per eval 12/2002 Ct Chest 09/2002   • Peripheral neuropathy     Back surgery   • Psoriasis    • Rotator cuff tear    • Spondylolysis    • Tinnitus    • Tubular adenoma 01/2016   • Vitamin D deficiency        Past Surgical History:   Procedure Laterality Date   • BACK SURGERY      discectomy L5-S1. Hodes 01/06 & 03/06 repeated. Repeat Disc surgery 03/2003.   • CATARACT EXTRACTION      Jennie, Zoran S/p Cataract Rt 04/11   • CHOLECYSTECTOMY  12/1999    [facility]   • COLONOSCOPY  2016   • TONSILLECTOMY  1948       Family History   Problem Relation Age of Onset   • Hypertension Mother    • Other Mother         acute myocardial infarction    • Other Father         acute myocardial infarction    • Diabetes Maternal Grandfather    • Breast cancer Other    • Liver cancer Other    • Colon cancer Neg Hx    • Colon polyps Neg Hx        Social History     Socioeconomic History   • Marital status:    Tobacco Use   • Smoking status: Former     Types: Cigarettes      "Quit date: 1968     Years since quittin.4   • Smokeless tobacco: Never   Substance and Sexual Activity   • Alcohol use: Never   • Drug use: Never           The following portions of the patient's history were reviewed and updated as appropriate: problem list, allergies, current medications, past medical history, past family history, past social history and past surgical history.    Review of Systems    Immunization History   Administered Date(s) Administered   • COVID-19 (PFIZER) Purple Cap Monovalent 2021, 2021, 10/01/2021   • Covid-19 (Pfizer) Gray Cap Monovalent 2022   • Fluzone High-Dose 65+yrs 2021, 2022   • Hepatitis A 2018, 2019   • Pneumococcal Conjugate 13-Valent (PCV13) 2018   • Pneumococcal Polysaccharide (PPSV23) 2019   • Shingrix 2021, 2021   • Tdap 08/10/2021       Objective   Vitals:    23 1111   BP: 102/80   Temp: 96.9 °F (36.1 °C)   Weight: 64.4 kg (142 lb)   Height: 152.4 cm (60\")     Body mass index is 27.73 kg/m².  Physical Exam  Vitals reviewed.   Constitutional:       Appearance: She is well-developed.   HENT:      Head: Normocephalic and atraumatic.   Cardiovascular:      Rate and Rhythm: Normal rate and regular rhythm.      Heart sounds: Normal heart sounds, S1 normal and S2 normal.   Pulmonary:      Effort: Pulmonary effort is normal.      Breath sounds: Normal breath sounds.   Skin:     General: Skin is warm.   Neurological:      Mental Status: She is alert.   Psychiatric:         Behavior: Behavior normal.         Procedures    Assessment & Plan   Diagnoses and all orders for this visit:    1. Hypothyroidism due to acquired atrophy of thyroid (Primary)  Comments:  change levothyroxine to 100 mcgm daily  Orders:  -     TSH; Future    2. Type 2 diabetes mellitus without complication, with long-term current use of insulin  Comments:  Increase insulin to 28 units daily and she will check her sugars BID and get those " numbers to me in 10 days.  Orders:  -     Insulin Glargine (LANTUS SOLOSTAR) 100 UNIT/ML injection pen; Inject 28 Units under the skin into the appropriate area as directed Daily.  Dispense: 15 mL; Refill: 1  -     metFORMIN (Glucophage) 500 MG tablet; Take two tabs po bid  Dispense: 120 tablet; Refill: 4  -     Comprehensive Metabolic Panel; Future  -     Hemoglobin A1c; Future  -     Microalbumin / Creatinine Urine Ratio - Urine, Clean Catch; Future    3. Mixed hyperlipidemia  Comments:  LDL is good               Reviewed cmp, flp, tsh and a1c. She has MMG set up with Dr. Gonzalez. She will stop hazelnut chocolate. Increase insulin and change dose of metformin to easier to swallow.  Return in about 3 months (around 8/30/2023), or 30 minutes, for Lab Before FUP.

## 2023-06-05 RX ORDER — ATORVASTATIN CALCIUM 40 MG/1
TABLET, FILM COATED ORAL
Qty: 90 TABLET | Refills: 3 | Status: SHIPPED | OUTPATIENT
Start: 2023-06-05

## 2023-06-18 DIAGNOSIS — Z79.4 TYPE 2 DIABETES MELLITUS WITHOUT COMPLICATION, WITH LONG-TERM CURRENT USE OF INSULIN: Chronic | ICD-10-CM

## 2023-06-18 DIAGNOSIS — E11.9 TYPE 2 DIABETES MELLITUS WITHOUT COMPLICATION, WITH LONG-TERM CURRENT USE OF INSULIN: Chronic | ICD-10-CM

## 2023-06-19 RX ORDER — LISINOPRIL 2.5 MG/1
TABLET ORAL
Qty: 90 TABLET | Refills: 3 | Status: SHIPPED | OUTPATIENT
Start: 2023-06-19

## 2023-06-19 RX ORDER — SAXAGLIPTIN 5 MG/1
TABLET, FILM COATED ORAL
Qty: 90 TABLET | Refills: 1 | Status: SHIPPED | OUTPATIENT
Start: 2023-06-19 | End: 2023-09-17

## 2023-09-05 RX ORDER — ATORVASTATIN CALCIUM 40 MG/1
TABLET, FILM COATED ORAL
Qty: 90 TABLET | Refills: 3 | Status: SHIPPED | OUTPATIENT
Start: 2023-09-05

## 2023-09-20 RX ORDER — FLURBIPROFEN SODIUM 0.3 MG/ML
SOLUTION/ DROPS OPHTHALMIC
Qty: 100 EACH | Refills: 0 | OUTPATIENT
Start: 2023-09-20

## 2023-12-12 DIAGNOSIS — F32.A CHRONIC DEPRESSION: Chronic | ICD-10-CM

## 2023-12-12 RX ORDER — DULOXETIN HYDROCHLORIDE 30 MG/1
30 CAPSULE, DELAYED RELEASE ORAL DAILY
Qty: 90 CAPSULE | Refills: 1 | Status: CANCELLED | OUTPATIENT
Start: 2023-12-12

## 2023-12-29 DIAGNOSIS — F32.A CHRONIC DEPRESSION: Chronic | ICD-10-CM

## 2023-12-29 RX ORDER — DULOXETIN HYDROCHLORIDE 30 MG/1
30 CAPSULE, DELAYED RELEASE ORAL DAILY
Qty: 90 CAPSULE | Refills: 3 | Status: CANCELLED | OUTPATIENT
Start: 2023-12-29

## 2024-01-15 DIAGNOSIS — F32.A CHRONIC DEPRESSION: Chronic | ICD-10-CM

## 2024-01-15 RX ORDER — DULOXETIN HYDROCHLORIDE 60 MG/1
60 CAPSULE, DELAYED RELEASE ORAL DAILY
Qty: 90 CAPSULE | Refills: 1 | OUTPATIENT
Start: 2024-01-15

## 2024-01-15 NOTE — TELEPHONE ENCOUNTER
Called pharmacy that patient is no longer under prescribers care    No retinal holes, tears, or detachment at this time.

## 2025-03-25 NOTE — TELEPHONE ENCOUNTER
Abdomen , soft, nontender, nondistended , no guarding or rigidity , no masses palpable , normal bowel sounds , Liver and Spleen,  no hepatosplenomegaly , liver nontender updated